# Patient Record
Sex: FEMALE | Race: WHITE | Employment: FULL TIME | URBAN - METROPOLITAN AREA
[De-identification: names, ages, dates, MRNs, and addresses within clinical notes are randomized per-mention and may not be internally consistent; named-entity substitution may affect disease eponyms.]

---

## 2018-04-13 ENCOUNTER — APPOINTMENT (OUTPATIENT)
Dept: GENERAL RADIOLOGY | Facility: CLINIC | Age: 53
End: 2018-04-13
Attending: EMERGENCY MEDICINE
Payer: COMMERCIAL

## 2018-04-13 ENCOUNTER — HOSPITAL ENCOUNTER (OUTPATIENT)
Facility: CLINIC | Age: 53
Setting detail: OBSERVATION
Discharge: HOME | End: 2018-04-14
Attending: EMERGENCY MEDICINE | Admitting: UROLOGY
Payer: COMMERCIAL

## 2018-04-13 ENCOUNTER — APPOINTMENT (OUTPATIENT)
Dept: ULTRASOUND IMAGING | Facility: CLINIC | Age: 53
End: 2018-04-13
Attending: EMERGENCY MEDICINE
Payer: COMMERCIAL

## 2018-04-13 DIAGNOSIS — N13.2 HYDRONEPHROSIS WITH URINARY OBSTRUCTION DUE TO URETERAL CALCULUS: Primary | ICD-10-CM

## 2018-04-13 DIAGNOSIS — N23 RENAL COLIC: ICD-10-CM

## 2018-04-13 DIAGNOSIS — N20.0 KIDNEY STONE: ICD-10-CM

## 2018-04-13 DIAGNOSIS — N13.30 HYDRONEPHROSIS, UNSPECIFIED HYDRONEPHROSIS TYPE: ICD-10-CM

## 2018-04-13 DIAGNOSIS — R52 INTRACTABLE PAIN: ICD-10-CM

## 2018-04-13 LAB
ALBUMIN UR-MCNC: NEGATIVE MG/DL
ANION GAP SERPL CALCULATED.3IONS-SCNC: 7 MMOL/L (ref 3–14)
APPEARANCE UR: ABNORMAL
BACTERIA #/AREA URNS HPF: ABNORMAL /HPF
BASOPHILS # BLD AUTO: 0.1 10E9/L (ref 0–0.2)
BASOPHILS NFR BLD AUTO: 0.7 %
BILIRUB UR QL STRIP: NEGATIVE
BUN SERPL-MCNC: 14 MG/DL (ref 7–30)
CALCIUM SERPL-MCNC: 8.9 MG/DL (ref 8.5–10.1)
CHLORIDE SERPL-SCNC: 104 MMOL/L (ref 94–109)
CO2 SERPL-SCNC: 28 MMOL/L (ref 20–32)
COLOR UR AUTO: YELLOW
CREAT SERPL-MCNC: 0.85 MG/DL (ref 0.52–1.04)
DIFFERENTIAL METHOD BLD: NORMAL
EOSINOPHIL # BLD AUTO: 0.1 10E9/L (ref 0–0.7)
EOSINOPHIL NFR BLD AUTO: 1.8 %
ERYTHROCYTE [DISTWIDTH] IN BLOOD BY AUTOMATED COUNT: 13.3 % (ref 10–15)
GFR SERPL CREATININE-BSD FRML MDRD: 70 ML/MIN/1.7M2
GLUCOSE SERPL-MCNC: 101 MG/DL (ref 70–99)
GLUCOSE UR STRIP-MCNC: NEGATIVE MG/DL
HCT VFR BLD AUTO: 41.6 % (ref 35–47)
HGB BLD-MCNC: 13.9 G/DL (ref 11.7–15.7)
HGB UR QL STRIP: ABNORMAL
IMM GRANULOCYTES # BLD: 0 10E9/L (ref 0–0.4)
IMM GRANULOCYTES NFR BLD: 0.1 %
KETONES UR STRIP-MCNC: NEGATIVE MG/DL
LEUKOCYTE ESTERASE UR QL STRIP: ABNORMAL
LYMPHOCYTES # BLD AUTO: 1.5 10E9/L (ref 0.8–5.3)
LYMPHOCYTES NFR BLD AUTO: 19.9 %
MCH RBC QN AUTO: 29.4 PG (ref 26.5–33)
MCHC RBC AUTO-ENTMCNC: 33.4 G/DL (ref 31.5–36.5)
MCV RBC AUTO: 88 FL (ref 78–100)
MONOCYTES # BLD AUTO: 0.7 10E9/L (ref 0–1.3)
MONOCYTES NFR BLD AUTO: 9.5 %
MUCOUS THREADS #/AREA URNS LPF: PRESENT /LPF
NEUTROPHILS # BLD AUTO: 4.9 10E9/L (ref 1.6–8.3)
NEUTROPHILS NFR BLD AUTO: 68 %
NITRATE UR QL: NEGATIVE
NRBC # BLD AUTO: 0 10*3/UL
NRBC BLD AUTO-RTO: 0 /100
PH UR STRIP: 5 PH (ref 5–7)
PLATELET # BLD AUTO: 248 10E9/L (ref 150–450)
POTASSIUM SERPL-SCNC: 3.6 MMOL/L (ref 3.4–5.3)
RBC # BLD AUTO: 4.72 10E12/L (ref 3.8–5.2)
RBC #/AREA URNS AUTO: 14 /HPF (ref 0–2)
SODIUM SERPL-SCNC: 139 MMOL/L (ref 133–144)
SOURCE: ABNORMAL
SP GR UR STRIP: 1.01 (ref 1–1.03)
SQUAMOUS #/AREA URNS AUTO: 14 /HPF (ref 0–1)
UROBILINOGEN UR STRIP-MCNC: 0 MG/DL (ref 0–2)
WBC # BLD AUTO: 7.3 10E9/L (ref 4–11)
WBC #/AREA URNS AUTO: 7 /HPF (ref 0–5)

## 2018-04-13 PROCEDURE — 80048 BASIC METABOLIC PNL TOTAL CA: CPT | Performed by: EMERGENCY MEDICINE

## 2018-04-13 PROCEDURE — 85025 COMPLETE CBC W/AUTO DIFF WBC: CPT | Performed by: EMERGENCY MEDICINE

## 2018-04-13 PROCEDURE — 96375 TX/PRO/DX INJ NEW DRUG ADDON: CPT

## 2018-04-13 PROCEDURE — 25000128 H RX IP 250 OP 636: Performed by: PHYSICIAN ASSISTANT

## 2018-04-13 PROCEDURE — 74019 RADEX ABDOMEN 2 VIEWS: CPT

## 2018-04-13 PROCEDURE — 81001 URINALYSIS AUTO W/SCOPE: CPT | Performed by: EMERGENCY MEDICINE

## 2018-04-13 PROCEDURE — 25000132 ZZH RX MED GY IP 250 OP 250 PS 637: Performed by: PHYSICIAN ASSISTANT

## 2018-04-13 PROCEDURE — 99221 1ST HOSP IP/OBS SF/LOW 40: CPT | Performed by: UROLOGY

## 2018-04-13 PROCEDURE — G0378 HOSPITAL OBSERVATION PER HR: HCPCS

## 2018-04-13 PROCEDURE — 96374 THER/PROPH/DIAG INJ IV PUSH: CPT

## 2018-04-13 PROCEDURE — 96361 HYDRATE IV INFUSION ADD-ON: CPT

## 2018-04-13 PROCEDURE — 96376 TX/PRO/DX INJ SAME DRUG ADON: CPT

## 2018-04-13 PROCEDURE — 76770 US EXAM ABDO BACK WALL COMP: CPT

## 2018-04-13 PROCEDURE — 84703 CHORIONIC GONADOTROPIN ASSAY: CPT | Performed by: EMERGENCY MEDICINE

## 2018-04-13 PROCEDURE — 25000128 H RX IP 250 OP 636: Performed by: EMERGENCY MEDICINE

## 2018-04-13 PROCEDURE — 99220 ZZC INITIAL OBSERVATION CARE,LEVL III: CPT | Performed by: PHYSICIAN ASSISTANT

## 2018-04-13 PROCEDURE — 25000125 ZZHC RX 250: Performed by: PHYSICIAN ASSISTANT

## 2018-04-13 PROCEDURE — 99285 EMERGENCY DEPT VISIT HI MDM: CPT | Mod: 25

## 2018-04-13 RX ORDER — TAMSULOSIN HYDROCHLORIDE 0.4 MG/1
0.4 CAPSULE ORAL DAILY
Status: ON HOLD | COMMUNITY
End: 2018-04-20

## 2018-04-13 RX ORDER — NALOXONE HYDROCHLORIDE 0.4 MG/ML
.1-.4 INJECTION, SOLUTION INTRAMUSCULAR; INTRAVENOUS; SUBCUTANEOUS
Status: DISCONTINUED | OUTPATIENT
Start: 2018-04-13 | End: 2018-04-14 | Stop reason: HOSPADM

## 2018-04-13 RX ORDER — ACETAMINOPHEN 650 MG/1
650 SUPPOSITORY RECTAL EVERY 4 HOURS PRN
Status: DISCONTINUED | OUTPATIENT
Start: 2018-04-13 | End: 2018-04-14 | Stop reason: HOSPADM

## 2018-04-13 RX ORDER — HYDROMORPHONE HYDROCHLORIDE 1 MG/ML
.3-.5 INJECTION, SOLUTION INTRAMUSCULAR; INTRAVENOUS; SUBCUTANEOUS
Status: DISCONTINUED | OUTPATIENT
Start: 2018-04-13 | End: 2018-04-14 | Stop reason: HOSPADM

## 2018-04-13 RX ORDER — CIPROFLOXACIN 2 MG/ML
400 INJECTION, SOLUTION INTRAVENOUS EVERY 12 HOURS
Status: DISCONTINUED | OUTPATIENT
Start: 2018-04-13 | End: 2018-04-14 | Stop reason: HOSPADM

## 2018-04-13 RX ORDER — LIDOCAINE 40 MG/G
CREAM TOPICAL
Status: DISCONTINUED | OUTPATIENT
Start: 2018-04-13 | End: 2018-04-14 | Stop reason: HOSPADM

## 2018-04-13 RX ORDER — DIPHENHYDRAMINE HYDROCHLORIDE 50 MG/ML
25 INJECTION INTRAMUSCULAR; INTRAVENOUS ONCE
Status: COMPLETED | OUTPATIENT
Start: 2018-04-13 | End: 2018-04-13

## 2018-04-13 RX ORDER — AMOXICILLIN 250 MG
1 CAPSULE ORAL 2 TIMES DAILY
Status: DISCONTINUED | OUTPATIENT
Start: 2018-04-13 | End: 2018-04-14 | Stop reason: HOSPADM

## 2018-04-13 RX ORDER — ONDANSETRON 2 MG/ML
4 INJECTION INTRAMUSCULAR; INTRAVENOUS
Status: COMPLETED | OUTPATIENT
Start: 2018-04-13 | End: 2018-04-13

## 2018-04-13 RX ORDER — ACETAMINOPHEN 325 MG/1
650 TABLET ORAL EVERY 4 HOURS PRN
Status: DISCONTINUED | OUTPATIENT
Start: 2018-04-13 | End: 2018-04-14 | Stop reason: HOSPADM

## 2018-04-13 RX ORDER — MORPHINE SULFATE 4 MG/ML
4 INJECTION, SOLUTION INTRAMUSCULAR; INTRAVENOUS
Status: DISCONTINUED | OUTPATIENT
Start: 2018-04-13 | End: 2018-04-13

## 2018-04-13 RX ORDER — OXYCODONE HYDROCHLORIDE 5 MG/1
5-10 TABLET ORAL
Status: DISCONTINUED | OUTPATIENT
Start: 2018-04-13 | End: 2018-04-14 | Stop reason: HOSPADM

## 2018-04-13 RX ORDER — ONDANSETRON 2 MG/ML
4 INJECTION INTRAMUSCULAR; INTRAVENOUS EVERY 6 HOURS PRN
Status: DISCONTINUED | OUTPATIENT
Start: 2018-04-13 | End: 2018-04-14 | Stop reason: HOSPADM

## 2018-04-13 RX ORDER — OXYCODONE AND ACETAMINOPHEN 5; 325 MG/1; MG/1
1-2 TABLET ORAL EVERY 4 HOURS PRN
COMMUNITY
End: 2018-04-17

## 2018-04-13 RX ORDER — TAMSULOSIN HYDROCHLORIDE 0.4 MG/1
0.4 CAPSULE ORAL DAILY
Status: DISCONTINUED | OUTPATIENT
Start: 2018-04-13 | End: 2018-04-14 | Stop reason: HOSPADM

## 2018-04-13 RX ORDER — HYDROMORPHONE HYDROCHLORIDE 1 MG/ML
0.5 INJECTION, SOLUTION INTRAMUSCULAR; INTRAVENOUS; SUBCUTANEOUS
Status: DISCONTINUED | OUTPATIENT
Start: 2018-04-13 | End: 2018-04-13

## 2018-04-13 RX ORDER — HYDROMORPHONE HYDROCHLORIDE 1 MG/ML
0.3 INJECTION, SOLUTION INTRAMUSCULAR; INTRAVENOUS; SUBCUTANEOUS
Status: DISCONTINUED | OUTPATIENT
Start: 2018-04-13 | End: 2018-04-13

## 2018-04-13 RX ORDER — SODIUM CHLORIDE 9 MG/ML
INJECTION, SOLUTION INTRAVENOUS CONTINUOUS
Status: DISCONTINUED | OUTPATIENT
Start: 2018-04-13 | End: 2018-04-14 | Stop reason: HOSPADM

## 2018-04-13 RX ORDER — POLYETHYLENE GLYCOL 3350 17 G/17G
17 POWDER, FOR SOLUTION ORAL DAILY PRN
Status: DISCONTINUED | OUTPATIENT
Start: 2018-04-13 | End: 2018-04-14 | Stop reason: HOSPADM

## 2018-04-13 RX ORDER — ONDANSETRON 4 MG/1
4 TABLET, ORALLY DISINTEGRATING ORAL EVERY 6 HOURS PRN
Status: DISCONTINUED | OUTPATIENT
Start: 2018-04-13 | End: 2018-04-14 | Stop reason: HOSPADM

## 2018-04-13 RX ORDER — TAMSULOSIN HYDROCHLORIDE 0.4 MG/1
0.4 CAPSULE ORAL ONCE
Status: DISCONTINUED | OUTPATIENT
Start: 2018-04-13 | End: 2018-04-13

## 2018-04-13 RX ORDER — AMOXICILLIN 250 MG
2 CAPSULE ORAL 2 TIMES DAILY
Status: DISCONTINUED | OUTPATIENT
Start: 2018-04-13 | End: 2018-04-14 | Stop reason: HOSPADM

## 2018-04-13 RX ADMIN — TAMSULOSIN HYDROCHLORIDE 0.4 MG: 0.4 CAPSULE ORAL at 14:15

## 2018-04-13 RX ADMIN — HYDROMORPHONE HYDROCHLORIDE 0.5 MG: 1 INJECTION, SOLUTION INTRAMUSCULAR; INTRAVENOUS; SUBCUTANEOUS at 18:26

## 2018-04-13 RX ADMIN — SODIUM CHLORIDE: 9 INJECTION, SOLUTION INTRAVENOUS at 20:49

## 2018-04-13 RX ADMIN — HYDROMORPHONE HYDROCHLORIDE 0.5 MG: 1 INJECTION, SOLUTION INTRAMUSCULAR; INTRAVENOUS; SUBCUTANEOUS at 13:53

## 2018-04-13 RX ADMIN — MORPHINE SULFATE 4 MG: 4 INJECTION INTRAVENOUS at 11:27

## 2018-04-13 RX ADMIN — ONDANSETRON 4 MG: 4 TABLET, ORALLY DISINTEGRATING ORAL at 14:54

## 2018-04-13 RX ADMIN — SENNOSIDES AND DOCUSATE SODIUM 2 TABLET: 8.6; 5 TABLET ORAL at 20:49

## 2018-04-13 RX ADMIN — OXYCODONE HYDROCHLORIDE 5 MG: 5 TABLET ORAL at 14:51

## 2018-04-13 RX ADMIN — DIPHENHYDRAMINE HYDROCHLORIDE 25 MG: 50 INJECTION, SOLUTION INTRAMUSCULAR; INTRAVENOUS at 10:33

## 2018-04-13 RX ADMIN — HYDROMORPHONE HYDROCHLORIDE 0.5 MG: 1 INJECTION, SOLUTION INTRAMUSCULAR; INTRAVENOUS; SUBCUTANEOUS at 16:21

## 2018-04-13 RX ADMIN — ONDANSETRON 4 MG: 2 INJECTION INTRAMUSCULAR; INTRAVENOUS at 10:33

## 2018-04-13 RX ADMIN — HYDROMORPHONE HYDROCHLORIDE 0.5 MG: 1 INJECTION, SOLUTION INTRAMUSCULAR; INTRAVENOUS; SUBCUTANEOUS at 11:51

## 2018-04-13 RX ADMIN — POLYETHYLENE GLYCOL 3350 17 G: 17 POWDER, FOR SOLUTION ORAL at 15:18

## 2018-04-13 RX ADMIN — SODIUM CHLORIDE: 9 INJECTION, SOLUTION INTRAVENOUS at 13:20

## 2018-04-13 RX ADMIN — MORPHINE SULFATE 4 MG: 4 INJECTION INTRAVENOUS at 10:33

## 2018-04-13 RX ADMIN — CIPROFLOXACIN 400 MG: 2 INJECTION, SOLUTION INTRAVENOUS at 18:28

## 2018-04-13 RX ADMIN — SODIUM CHLORIDE, POTASSIUM CHLORIDE, SODIUM LACTATE AND CALCIUM CHLORIDE 1000 ML: 600; 310; 30; 20 INJECTION, SOLUTION INTRAVENOUS at 10:33

## 2018-04-13 RX ADMIN — OXYCODONE HYDROCHLORIDE 10 MG: 5 TABLET ORAL at 20:49

## 2018-04-13 RX ADMIN — HYDROMORPHONE HYDROCHLORIDE 0.5 MG: 1 INJECTION, SOLUTION INTRAMUSCULAR; INTRAVENOUS; SUBCUTANEOUS at 12:40

## 2018-04-13 ASSESSMENT — PAIN DESCRIPTION - DESCRIPTORS
DESCRIPTORS: CONSTANT;STABBING
DESCRIPTORS: SHARP;STABBING

## 2018-04-13 ASSESSMENT — ACTIVITIES OF DAILY LIVING (ADL)
FALL_HISTORY_WITHIN_LAST_SIX_MONTHS: NO
SWALLOWING: 0-->SWALLOWS FOODS/LIQUIDS WITHOUT DIFFICULTY
COGNITION: 0 - NO COGNITION ISSUES REPORTED
AMBULATION: 0-->INDEPENDENT
RETIRED_COMMUNICATION: 0-->UNDERSTANDS/COMMUNICATES WITHOUT DIFFICULTY
DRESS: 0-->INDEPENDENT
BATHING: 0-->INDEPENDENT
WHICH_OF_THE_ABOVE_FUNCTIONAL_RISKS_HAD_A_RECENT_ONSET_OR_CHANGE?: TOILETING
RETIRED_EATING: 0-->INDEPENDENT
TOILETING: 0-->INDEPENDENT
TRANSFERRING: 0-->INDEPENDENT

## 2018-04-13 ASSESSMENT — ENCOUNTER SYMPTOMS
CHILLS: 0
VOMITING: 0
FLANK PAIN: 1
FEVER: 0
NAUSEA: 0

## 2018-04-13 NOTE — IP AVS SNAPSHOT
MRN:[id number]                      After Visit Summary   4/13/2018    Jennie Davison    MRN: [id number]           Thank you!     Thank you for choosing [facility] for your care. Our goal is always to provide you with excellent care. Hearing back from our patients is one way we can continue to improve our services. Please take a few minutes to complete the written survey that you may receive in the mail after you visit. If you would like to speak to someone directly about your visit please contact Patient Relations at [phone]. Thank you!          Patient Information     Date Of Birth          1965        Designated Caregiver       Most Recent Value    Caregiver    Will someone help with your care after discharge? yes    Name of designated caregiver .    Phone number of caregiver .    Caregiver address .      About your hospital stay     You were admitted on:  April 13, 2018 You last received care in the:  [facility] PreOP/PostOP    You were discharged on:  April 14, 2018       Who to Call     For medical emergencies, please call 911.  For non-urgent questions about your medical care, please call your primary care provider or clinic, [phone]  For questions related to your surgery, please call your surgery clinic        Attending Provider     Provider Specialty    Eve Reyes MD Emergency Medicine    [place], Ezequiel Sagastume MD Internal Medicine       Primary Care Provider Office Phone # Fax #    Kenneth G Pallas, [phone]      After Care Instructions     Discharge Instructions       DIET:  -Regular    DISCHARGE ACTIVITY  - return to normal activity only limited by hematuria (blood in urine) that gets worse with increased activity due to the stent - this is normal  - Do not drive until you can press the brake pedal quickly and fully without pain.   - Do not operate a motor vehicle while taking narcotic pain medications.     MEDICATIONS   -  Wean yourself off narcotic pain medications in the first 1-2 days. Do NOT combine with other narcotics or sources of tylenol. Do NOT drive while on narcotic pain medications  - Do not take more than 4,000mg of Tylenol (acetaminophen) in any 24 hour period, as this can cause liver damage.  - Take stool softeners such as Senna while you are using narcotics, but stop if you develop diarrhea.   - flomax and detrol may help you with your stent symptoms    STENT  INSTRUCTIONS:   - You have a ureteral stent in place. You can expect some flank discomfort on that side, possibly worse with urination, and you may experience the urge to void more frequently. You may experience burning in your urethra with urination as well. Continue to take medications as prescribed to reduce stent discomfort. Drink 2-3L of water a day to keep your urine clear to light pink in color. Some blood in your urine can be expected but should clear with increased water consumption as instructed. Call for thickening red urine, or inability to void.     FOLLOW-UP:   - Follow up with Dr. Lyons in the next few weeks for stone removal and stent removal. The Urology  will call you with your appointment date. Ureteral stents, if left in long term, can result in numerous complications, including encrustation, infection, renal failure and even possibly death. Hence it is critical that you follow up to have your stent removed.  - Call your primary care provider to touch base regarding your recent admission.     - Call or return sooner than your regularly scheduled visit if you develop any of the following:  fever, uncontrolled pain, uncontrolled nausea or vomiting, as well as worsening blood in the urine or inability to urinate (pee).                  Further instructions from your care team       STENT INFORMATION/DISCHARGE INSTRUCTIONS  [facility] / UROLOGY  DR. ANTONINO LYONS M.D.  CLINIC PHONE NUMBER:  [phone]    During surgery, a stent  may be placed in the ureter.  The ureter is the tube that drains urine from the kidney to the bladder.  The stent is placed to dilate (open) the ureter so stone fragments can pass easily through the ureter or to decrease ureteral swelling after surgery or to relieve an obstruction.      The stent is made of silicone.  The upper end of the stent curls in the kidney while the lower end rests in the bladder.    While the stent is in place you may experience the following symptoms:  Blood and/or small blood clots in the urine  Bladder spasms (frequency and urgency of urination)  Discomfort or aching in the back or side where the stent is  Burning or discomfort at the end of urine stream    To decrease these symptoms you should:  Take antispasmodic medication as prescribed (Detrol, Ditropan, etc.)  Drink plenty of fluids but avoid caffeine and citrus (include cranberry)  If you are having discomfort in back or side, decrease activity    Please call your physician or the physician on call if you experience:  Fever greater than 101 degrees  Severe pain not relieved by pain medication or rest    Please make an appointment for the removal of the stent according to your physician's instructions.      CYSTOSCOPY DISCHARGE INSTRUCTIONS    YOU MAY GO BACK TO YOUR NORMAL DIET AND ACTIVITY, UNLESS YOUR DOCTOR TELLS YOU NOT TO.    FOR THE NEXT TWO DAYS, YOU MAY NOTICE:    SOME BLOOD IN YOUR URINE.  SOME BURNING WHEN YOU URINATE (USE THE TOILET).  AN URGE TO URINATE MORE OFTEN.  BLADDER SPASMS.    THESE ARE NORMAL AFTER THE PROCEDURE.  THEY SHOULD GO AWAY AFTER A DAY OR TWO.  TO RELIEVE THESE PROBLEMS:     DRINK 6 TO 8 LARGE GLASSES OF WATER EACH DAY (INCLUDES DRINKS AT MEALS).  THIS WILL HELP CLEAR THE URINE.    TAKE WARM BATHS TO RELIEVE PAIN AND BLADDER SPASMS.  DO NOT ADD ANYTHING TO THE BATH WATER.    YOUR DOCTOR MAY PRESCRIBE PAIN MEDICINE.  YOU MAY ALSO TAKE TYLENOL (ACETAMINOPHEN) FOR PAIN.    CALL YOUR SURGEON IF YOU  HAVE:    A FEVER OVER 100 DEGREES FOR MORE THAN A DAY.  CHECK YOUR TEMPERATURE UNDER YOUR TONGUE.    CHILLS.    FAILURE TO URINATE (NO URINE COMES OUT WHEN YOU TRY TO USE THE TOILET).  TRY SOAKING IN A BATHTUB FULL OF WARM WATER.  IF STILL NO URINE, CALL YOUR DOCTOR.    A LOT OF BLOOD IN THE URINE, OR BLOOD CLOTS LARGER THAN A NICKEL.      PAIN IN THE BACK OR BELLY AREA (ABDOMEN).    PAIN OR SPASMS THAT ARE NOT RELIEVED BY WARM TUB BATHS AND PAIN MEDICINE.      SEVERE PAIN, BURNING OR OTHER PROBLEMS WHILE PASSING URINE.    PAIN THAT GETS WORSE AFTER TWO DAYS.       GENERAL ANESTHESIA OR SEDATION ADULT DISCHARGE INSTRUCTIONS   SPECIAL PRECAUTIONS FOR 24 HOURS AFTER SURGERY    IT IS NOT UNUSUAL TO FEEL LIGHT-HEADED OR FAINT, UP TO 24 HOURS AFTER SURGERY OR WHILE TAKING PAIN MEDICATION.  IF YOU HAVE THESE SYMPTOMS; SIT FOR A FEW MINUTES BEFORE STANDING AND HAVE SOMEONE ASSIST YOU WHEN YOU GET UP TO WALK OR USE THE BATHROOM.    YOU SHOULD REST AND RELAX FOR THE NEXT 24 HOURS AND YOU MUST MAKE ARRANGEMENTS TO HAVE SOMEONE STAY WITH YOU FOR AT LEAST 24 HOURS AFTER YOUR DISCHARGE.  AVOID HAZARDOUS AND STRENUOUS ACTIVITIES.  DO NOT MAKE IMPORTANT DECISIONS FOR 24 HOURS.    DO NOT DRIVE ANY VEHICLE OR OPERATE MECHANICAL EQUIPMENT FOR 24 HOURS FOLLOWING THE END OF YOUR SURGERY.  EVEN THOUGH YOU MAY FEEL NORMAL, YOUR REACTIONS MAY BE AFFECTED BY THE MEDICATION YOU HAVE RECEIVED.    DO NOT DRINK ALCOHOLIC BEVERAGES FOR 24 HOURS FOLLOWING YOUR SURGERY.    DRINK CLEAR LIQUIDS (APPLE JUICE, GINGER ALE, 7-UP, BROTH, ETC.).  PROGRESS TO YOUR REGULAR DIET AS YOU FEEL ABLE.    YOU MAY HAVE A DRY MOUTH, A SORE THROAT, MUSCLES ACHES OR TROUBLE SLEEPING.  THESE SHOULD GO AWAY AFTER 24 HOURS.    CALL YOUR DOCTOR FOR ANY OF THE FOLLOWING:  SIGNS OF INFECTION (FEVER, GROWING TENDERNESS AT THE SURGERY SITE, A LARGE AMOUNT OF DRAINAGE OR BLEEDING, SEVERE PAIN, FOUL-SMELLING DRAINAGE, REDNESS OR SWELLING.    IT HAS BEEN OVER 8 TO 10 HOURS  "SINCE SURGERY AND YOU ARE STILL NOT ABLE TO URINATE (PASS WATER).     You received Toradol, an IV form of ibuprofen (Motrin) at 9:30AM.  Do not take any ibuprofen products until 3:30PM.             Pending Results     No orders found for last 3 day(s).            Statement of Approval     Ordered          18 1103  I have reviewed and agree with all the recommendations and orders detailed in this document.  EFFECTIVE NOW     Approved and electronically signed by:  Lj Dewey MD           18 1058  I have reviewed and agree with all the recommendations and orders detailed in this document.  EFFECTIVE NOW     Approved and electronically signed by:  Kandy Srivastava PA-C             Admission Information     Date & Time Provider Department Dept. Phone    2018 Ezequiel Rodgers MD [facility] PreOP/PostOP [phone]      Your Vitals Were     Blood Pressure Pulse Temperature Respirations Height Weight    146/92 71 98.3  F (36.8  C) (Temporal) 14 1.727 m (5' 8\") 81.6 kg (180 lb)    Pulse Oximetry BMI (Body Mass Index)                98% 27.37 kg/m2          MyChart Information     [employer] lets you send messages to your doctor, view your test results, renew your prescriptions, schedule appointments and more. To sign up, go to [email or web address] . Click on \"Log in\" on the left side of the screen, which will take you to the Welcome page. Then click on \"Sign up Now\" on the right side of the page.     You will be asked to enter the access code listed below, as well as some personal information. Please follow the directions to create your username and password.     Your access code is: [email or web address]  Expires: 2018  9:23 AM     Your access code will  in 90 days. If you need help or a new code, please call your [place] clinic or [phone].        Care EveryWhere ID     This is your Care EveryWhere ID. This could be used by other organizations to access your [place] medical " records  [id number]        Equal Access to Services     NARAYAN MALDONADO : Hadii antonio Fallon, waaxda lulety, qaybta ya briceno, trina gonsalves. So [facility] [phone].    ATENCIÓN: Si habla español, tiene a oquendo disposición servicios gratuitos de asistencia lingüística. Llame al [phone].    We comply with applicable federal civil rights laws and [place] laws. We do not discriminate on the basis of race, color, national origin, age, disability, sex, sexual orientation, or gender identity.               Review of your medicines      START taking        Dose / Directions    senna 8.6 MG tablet   Commonly known as:  SENOKOT   Used for:  Renal colic        Dose:  1 tablet   Take 1 tablet by mouth 2 times daily   Quantity:  60 tablet   Refills:  0       tolterodine 4 MG 24 hr capsule   Commonly known as:  DETROL LA   Used for:  Renal colic        Dose:  4 mg   Take 1 capsule (4 mg) by mouth daily as needed For bladder spasms and stent discomfort   Quantity:  14 capsule   Refills:  0         CONTINUE these medicines which have NOT CHANGED        Dose / Directions    CIPROFLOXACIN PO        Dose:  250 mg   Take 250 mg by mouth 2 times daily Take for 5 days. Started on 4/10/18   Refills:  0       FLOMAX 0.4 MG capsule   Generic drug:  tamsulosin        Dose:  0.4 mg   Take 0.4 mg by mouth daily (received a 20 day supply)   Refills:  0       IBUPROFEN PO        Dose:  200-400 mg   Take 200-400 mg by mouth every 6 hours as needed for moderate pain   Refills:  0       LIOTHYRONINE SODIUM PO        Dose:  10 mcg   Take 10 mcg by mouth daily   Refills:  0       oxyCODONE-acetaminophen 5-325 MG per tablet   Commonly known as:  PERCOCET        Dose:  1-2 tablet   Take 1-2 tablets by mouth every 4 hours as needed   Refills:  0       SYNTHROID PO        Dose:  100 mcg   Take 100 mcg by mouth daily   Refills:  0         STOP taking     TYLENOL PO                Where to get your  medicines      These medications were sent to [facility] [place], [place] - [address] [address]  [address] [facility] [address]     Phone:  [phone]     senna 8.6 MG tablet    tolterodine 4 MG 24 hr capsule                Protect others around you: Learn how to safely use, store and throw away your medicines at www.disposemymeds.org.        ANTIBIOTIC INSTRUCTION     You've Been Prescribed an Antibiotic - Now What?  Your healthcare team thinks that you or your loved one might have an infection. Some infections can be treated with antibiotics, which are powerful, life-saving drugs. Like all medications, antibiotics have side effects and should only be used when necessary. There are some important things you should know about your antibiotic treatment.      Your healthcare team may run tests before you start taking an antibiotic.    Your team may take samples (e.g., from your blood, urine or other areas) to run tests to look for bacteria. These test can be important to determine if you need an antibiotic at all and, if you do, which antibiotic will work best.      Within a few days, your healthcare team might change or even stop your antibiotic.    Your team may start you on an antibiotic while they are working to find out what is making you sick.    Your team might change your antibiotic because test results show that a different antibiotic would be better to treat your infection.    In some cases, once your team has more information, they learn that you do not need an antibiotic at all. They may find out that you don't have an infection, or that the antibiotic you're taking won't work against your infection. For example, an infection caused by a virus can't be treated with antibiotics. Staying on an antibiotic when you don't need it is more likely to be harmful than helpful.      You may experience side effects from your antibiotic.    Like all medications, antibiotics have side  effects. Some of these can be serious.    Let you healthcare team know if you have any known allergies when you are admitted to the hospital.    One significant side effect of nearly all antibiotics is the risk of severe and sometimes deadly diarrhea caused by Clostridium difficile (C. Difficile). This occurs when a person takes antibiotics because some good germs are destroyed. Antibiotic use allows C. diificile to take over, putting patients at high risk for this serious infection.    As a patient or caregiver, it is important to understand your or your loved one's antibiotic treatment. It is especially important for caregivers to speak up when patients can't speak for themselves. Here are some important questions to ask your healthcare team.    What infection is this antibiotic treating and how do you know I have that infection?    What side effects might occur from this antibiotic?    How long will I need to take this antibiotic?    Is it safe to take this antibiotic with other medications or supplements (e.g., vitamins) that I am taking?     Are there any special directions I need to know about taking this antibiotic? For example, should I take it with food?    How will I be monitored to know whether my infection is responding to the antibiotic?    What tests may help to make sure the right antibiotic is prescribed for me?      Information provided by:  www.cdc.gov/getsmart  U.S. Department of Health and Human Services  Centers for disease Control and Prevention  National Center for Emerging and Zoonotic Infectious Diseases  Division of Healthcare Quality Promotion        Information about OPIOIDS     PRESCRIPTION OPIOIDS: WHAT YOU NEED TO KNOW    Prescription opioids can be used to help relieve moderate to severe pain and are often prescribed following a surgery or injury, or for certain health conditions. These medications can be an important part of treatment but also come with serious risks. It is important  to work with your health care provider to make sure you are getting the safest, most effective care.    WHAT ARE THE RISKS AND SIDE EFFECTS OF OPIOID USE?  Prescription opioids carry serious risks of addiction and overdose, especially with prolonged use. An opioid overdose, often marked by slowed breathing can cause sudden death. The use of prescription opioids can have a number of side effects as well, even when taken as directed:      Tolerance - meaning you might need to take more of a medication for the same pain relief    Physical dependence - meaning you have symptoms of withdrawal when a medication is stopped    Increased sensitivity to pain    Constipation    Nausea, vomiting, and dry mouth    Sleepiness and dizziness    Confusion    Depression    Low levels of testosterone that can result in lower sex drive, energy, and strength    Itching and sweating    RISKS ARE GREATER WITH:    History of drug misuse, substance use disorder, or overdose    Mental health conditions (such as depression or anxiety)    Sleep apnea    Older age (65 years or older)    Pregnancy    Avoid alcohol while taking prescription opioids.   Also, unless specifically advised by your health care provider, medications to avoid include:    Benzodiazepines (such as Xanax or Valium)    Muscle relaxants (such as Soma or Flexeril)    Hypnotics (such as Ambien or Lunesta)    Other prescription opioids    KNOW YOUR OPTIONS:  Talk to your health care provider about ways to manage your pain that do not involve prescription opioids. Some of these options may actually work better and have fewer risks and side effects:    Pain relievers such as acetaminophen, ibuprofen, and naproxen    Some medications that are also used for depression or seizures    Physical therapy and exercise    Cognitive behavioral therapy, a psychological, goal-directed approach, in which patients learn how to modify physical, behavioral, and emotional triggers of pain and  stress    IF YOU ARE PRESCRIBED OPIOIDS FOR PAIN:    Never take opioids in greater amounts or more often than prescribed    Follow up with your primary health care provider and work together to create a plan on how to manage your pain.    Talk about ways to help manage your pain that do not involve prescription opioids    Talk about all concerns and side effects    Help prevent misuse and abuse    Never sell or share prescription opioids    Never use another person's prescription opioids    Store prescription opioids in a secure place and out of reach of others (this may include visitors, children, friends, and family)    Visit www.cdc.gov/drugoverdose to learn about risks of opioid abuse and overdose    If you believe you may be struggling with addiction, tell your health care provider and ask for guidance or call [facility]'s National Helpline at 8-239-832-HELP    LEARN MORE / www.cdc.gov/drugoverdose/prescribing/guideline.html    Safely dispose of unused prescription opioids: Find your local drug take-back programs and more information about the importance of safe disposal at www.doseofreality.[place].gov             Medication List: This is a list of all your medications and when to take them. Check marks below indicate your daily home schedule. Keep this list as a reference.      Medications           Morning Afternoon Evening Bedtime As Needed    CIPROFLOXACIN PO   Take 250 mg by mouth 2 times daily Take for 5 days. Started on 4/10/18                                FLOMAX 0.4 MG capsule   Take 0.4 mg by mouth daily (received a 20 day supply)   Last time this was given:  0.4 mg on 4/13/2018  2:15 PM   Generic drug:  tamsulosin                                IBUPROFEN PO   Take 200-400 mg by mouth every 6 hours as needed for moderate pain                                LIOTHYRONINE SODIUM PO   Take 10 mcg by mouth daily                                oxyCODONE-acetaminophen 5-325 MG per tablet   Commonly known as:   PERCOCET   Take 1-2 tablets by mouth every 4 hours as needed                                senna 8.6 MG tablet   Commonly known as:  SENOKOT   Take 1 tablet by mouth 2 times daily                                SYNTHROID PO   Take 100 mcg by mouth daily                                tolterodine 4 MG 24 hr capsule   Commonly known as:  DETROL LA   Take 1 capsule (4 mg) by mouth daily as needed For bladder spasms and stent discomfort

## 2018-04-13 NOTE — ED PROVIDER NOTES
"  History     Chief Complaint:  Flank Pain      HPI   Jennie Davison is a 52 year old female who presents with left flank pain. The patient was seen on Monday, 4 days ago, at [facility] and diagnosed with a 4mm kidney stone. Patient stayed overnight and was going to have a procedure on Tuesday. Patient woke up Tuesday with no pain. Patient was sent home without procedure. Patient has had intermittent pain in her left flank since being discharged but it has become more severe and this morning was intolerable.  She took 1 tablet Percocet around 0915 and another around 0930. Patient is on Flomax. The patient hasn't had any nausea/vomiting, fevers, or chills. She last had a kidney stone pass around 12 years ago. She does not currently have a urologist.      [facility]: CT Abdomen/Pelvis w/o IV contrast-stone protocol (04/09/18):   (Impression)  1. Mild-moderate left-sided hydroureteronephrosis upstream from an obstructive 4 x 4 mm calculus within the distal left ureter.    2. Nonobstructive subcentimeter bilateral renal calculi.    3. Multiple simple-appearing cysts bilaterally, as well as a Bosniak 2 cyst within the superior pole of the left kidney.    4. Cholelithiasis, without CT evidence of acute cholecystitis.    Allergies:  NKDA     Medications:    Synthroid  Ciprofloxacin  Flomax  Percocet    Past Medical History:    Kidney stones    Past Surgical History:    The patient does not have any pertinent past surgical history  Family History:    No past pertinent family history.     Social History:  The patient denies tobacco or alcohol use.  Marital Status:   [2]     Review of Systems   Constitutional: Negative for chills and fever.   Gastrointestinal: Negative for nausea and vomiting.   Genitourinary: Positive for flank pain.   All other systems reviewed and are negative.        Physical Exam   First Vitals:  BP: (!) 160/103  Pulse: 104  Temp: 98.5  F (36.9  C)  Resp: 18  Height: 172.7 cm (5' 8\")  Weight: " 81.6 kg (180 lb)  SpO2: 97 %      Physical Exam  General: uncomfortable appearing adult female sitting upright  Eyes: PERRL, Conjunctive within normal limits  ENT: Moist mucous membranes, oropharynx clear.   CV: Normal S1S2, no murmur, rub or gallop. Regular rate and rhythm. Tender to percussion on the left abdomen. diffuse left side tenderness on palpation.  Resp: Clear to auscultation bilaterally, no wheezes, rales or rhonchi. Normal respiratory effort.  GI: Abdomen is soft, nontender and nondistended. No palpable masses. No rebound or guarding.  MSK: No edema. Nontender. Normal active range of motion.  Skin: Warm and dry. No rashes or lesions or ecchymoses on visible skin.  Neuro: Alert and oriented. Responds appropriately to all questions and commands. No focal findings appreciated. Normal muscle tone.  Psych: Appropriate mood and affect. Pleasant.      Emergency Department Course     Imaging:  Radiographic findings were communicated with the patient who voiced understanding of the findings.  XR KUB:   No radiopaque urinary calcifications appreciated. Marked  stool and gas-filled colon and rectum. As per radiology.       US Retroperitoneal:  Moderate left hydronephrosis. As per radiology.       Laboratory:  CBC: WBC: 7.3, HGB: 13.96, PLT: 248    BMP: Glucose 101, o/w WNL (Creatinine: 0.85)    UA with micro: Bloo - Small, Leukocyte Esterase - Trace, Bacteria - Moderate, Squamous Epithelial/HPF 14, Mucous - Present, RBC/HPF 14, WBC/HPF 7, o/w negative    Interventions:  1033 Lactated ringers 1000 mL IV Bolus   1127 Morphine 4 mg IV  1033 Benadryl 25 mg IV  1033 Zofran 4 mg IV  1151 Dilaudid 0.5 mg IV      Emergency Department Course:  Nursing notes and vitals reviewed.     1014 I performed an exam of the patient as documented above.     IV inserted. Medicine administered as documented above.     Blood drawn. This was sent to the lab for further testing, results above.    The patient provided a urine sample here in  the emergency department. This was sent for laboratory testing, findings above.     The patient was sent for a KUB XR while in the emergency department, findings above.     The patient was sent for a retroperitoneal US while in the emergency department, findings above.     I rechecked the patient and discussed the results of her workup thus far. She is still having pain, requesting more pain medication.  Agreeable with the plan for admission.    1208 I consulted with Dr. Lyons, Urology, regarding the patient's history and presentation here in the emergency department.    1215 I consulted with Jesenia Pryor PA-C, accepting on behalf of Dr. Rodgers, Hospitalist, regarding the patient's history and presentation here in the emergency department.    Findings and plan explained to the patient who consents to admission. Discussed the patient with Dr. Rodgers, who will admit the patient to an observation bed for further monitoring, evaluation, and treatment.        Impression & Plan      Medical Decision Making:  Jennie Davison is a 52 year old female with a recent diagnosis of a left ureteral stone, who was admitted at [facility], who presents to the emergency department for concerns of uncontrolled pain. She ultimately did not have the procedure there, with observation at home instead. Here in the ED, despite Percocet at home, she had uncontrolled pain. She had multiple doses of IV narcotics with ongoing pain suggesting need for admission for ongoing pain control. She had normal renal function and no signs of infection. Otherwise, there is left hydronephrosis, but no visible stone on KUB or ultrasound. I discussed her care with Dr. Lyons of Urology, who is aware of her admission with possibility of intervention needed. I discussed admission with the patient. She verbalized understanding and agreement.      Diagnosis:    ICD-10-CM   1. Renal colic N23   2. Intractable pain R52   3. Hydronephrosis, unspecified  hydronephrosis type N13.30       Disposition:  Admitted to Jennifer Pryor PA-C of the Hospitalist service, in stable condition to an observation bed.    Discharge Medications:    I, Daniel Sales, am serving as a scribe on 4/13/2018 at 10:14 AM to personally document services performed by Eve Reyes MD based on my observations and the provider's statements to me.       Daniel Sales  4/13/2018   [facility] EMERGENCY DEPARTMENT       Eve Reyes MD  04/13/18 1418

## 2018-04-13 NOTE — PLAN OF CARE
Problem: Patient Care Overview  Goal: Discharge Needs Assessment  Outcome: No Change  ROOM # 212-1    Living Situation (if not independent, order SW consult): With  Juan Miguel in [place]   Facility name:  : Juan Miguel (spouse) [phone]    Activity level at baseline: Independent   Activity level on admit: Independent      Patient registered to observation; given Patient Bill of Rights; given the opportunity to ask questions about observation status and their plan of care.  Patient has been oriented to the observation room, bathroom and call light is in place.    Discussed discharge goals and expectations with patient/family.

## 2018-04-13 NOTE — ED NOTES
ABCs intact. Pt c/o L flank pain. Pt was seen on Monday at [facility] and dx with a 4mm stone. Pt stayed overnight and was going to have a procedure on Tuesday. Pt woke up Tuesday with no pain. Pt was sent home without procedure. Pt c/o intermittent pain since discharge. Pt took Percocet 1 tablet around 0915 and 0930. Pt is on flomax and abx as well.

## 2018-04-13 NOTE — PLAN OF CARE
Problem: Patient Care Overview  Goal: Discharge Needs Assessment  Outcome: No Change  PRIMARY DIAGNOSIS: ACUTE RENAL COLIC, Left    OUTPATIENT/OBSERVATION GOALS TO BE MET BEFORE DISCHARGE  1. Pain Status: Improved but still requiring IV narcotics.    2. Tolerating adequate PO diet: No, NPO for urology consult    3. Surgical Intervention planned: Yes, tomorrow AM if stone isn't passed    4. Cleared by consultants (if involved): No    5. Return to near baseline physical activity: Yes    Discharge Planner Nurse   Safe discharge environment identified: Yes  Barriers to discharge: No       Entered by: Mamie Johns 04/13/2018 2:04 PM     Please review provider order for any additional goals.   Nurse to notify provider when observation goals have been met and patient is ready for discharge.    Alert and oriented x4. VSS. Severe 8/10 left abdomen pain described as sharp, constant. Gave IV dilaudid.  with urology saw patient and will plan to do stone removal tomorrow, unless stone is passed. Straining urine. Has fluids at 150ml/hr. On IV flomax. Has not had a BM since Monday 4/9, will give stool regimen. Patient given the ok to eat and will start oral regimen after an hour. Patient up independently but educated to call if feeling unsteady/unsafe. Will continue to monitor.

## 2018-04-13 NOTE — PLAN OF CARE
Problem: Patient Care Overview  Goal: Individualization & Mutuality  Outcome: No Change  PRIMARY DIAGNOSIS: PAIN; Moderate Left Hydronephrosis 2* (L) Ureteral Stone  OUTPATIENT/OBSERVATION GOALS TO BE MET BEFORE DISCHARGE:  1. Pain Status: Improved but still requiring IV narcotics.     2. Return to near baseline physical activity: No, constipation & intermittent nausea persisting    3. Cleared for discharge by consultants (if involved): No; has not passed any stones     Discharge Planner Nurse   Safe discharge environment identified: No; constipation with intermittent nausea & persist intermittent pain  Barriers to discharge: Yes; has not passed Any stones in urine        Entered by: LEORA RODRIGUEZ 04/13/2018 5:20 PM     Please review provider order for any additional goals.   Nurse to notify provider when observation goals have been met and patient is ready for discharge.

## 2018-04-13 NOTE — PHARMACY-ADMISSION MEDICATION HISTORY
Admission medication history interview status for this patient is complete. See [facility] admission navigator for allergy information, prior to admission medications and immunization status.     Medication history interview source(s):Patient  Medication history resources (including written lists, pill bottles, clinic record):Printed list  Primary pharmacy:[facility]    Changes made to PTA medication list:  Added: all  Deleted: none  Changed: none    Actions taken by pharmacist (provider contacted, etc):None     Additional medication history information:None    Medication reconciliation/reorder completed by provider prior to medication history? No    Do you take OTC medications (eg tylenol, ibuprofen, fish oil, eye/ear drops, etc)? Y(Y/N)    For patients on insulin therapy: N (Y/N)    Time spent in this activity: 5 min    Prior to Admission medications    Medication Sig Last Dose Taking? Auth Provider   IBUPROFEN PO Take 200-400 mg by mouth every 6 hours as needed for moderate pain 4/13/2018 at Unknown time Yes Unknown, Entered By History   Acetaminophen (TYLENOL PO) Take 325-650 mg by mouth every 6 hours as needed for mild pain or fever 4/13/2018 at Unknown time Yes Unknown, Entered By History   Levothyroxine Sodium (SYNTHROID PO) Take 100 mcg by mouth daily 4/13/2018 at Unknown time Yes Unknown, Entered By History   LIOTHYRONINE SODIUM PO Take 10 mcg by mouth daily 4/13/2018 at Unknown time Yes Unknown, Entered By History   CIPROFLOXACIN PO Take 250 mg by mouth 2 times daily Take for 5 days. Started on 4/10/18 4/13/2018 at Unknown time Yes Unknown, Entered By History   oxyCODONE-acetaminophen (PERCOCET) 5-325 MG per tablet Take 1-2 tablets by mouth every 4 hours as needed 4/13/2018 at Unknown time Yes Unknown, Entered By History   tamsulosin (FLOMAX) 0.4 MG capsule Take 0.4 mg by mouth daily 4/12/2018 at noon Yes Unknown, Entered By History

## 2018-04-13 NOTE — CONSULTS
Consult Date:  04/13/2018      UROLOGY CONSULTATION      REASON FOR CONSULTATION:  Left ureteral stone.      HISTORY OF PRESENT ILLNESS:  Jennie Davison is a 52-year-old woman who began having severe left-sided flank pain while she was at work on Monday.  She went to the Emergency Department at [facility] in [place] and was diagnosed with a 4 mm distal left ureteral stone.  Her urinalysis and labs otherwise appeared unremarkable at that time.  She was admitted to the hospital overnight at [facility] on Monday night, and she says that they had initially planned some form of stone procedure possibly on Tuesday, but then her pain resolved completely.  She was ultimately discharged home to try to pass her stone.  She has done poorly at home and has had more or less continued left-sided flank pain since being discharged from [facility].  She came back to the Emergency Department here this afternoon with the above symptoms.  A KUB performed here did not show the stone, but a renal ultrasound showed persistent left hydronephrosis.  Her urinalysis continues to show hematuria to be present.  She has had no fevers, chills, nausea or vomiting.      PAST MEDICAL HISTORY:     1.  She has had 1 prior stone about a decade ago that she was able to successfully pass on her own.   2.  Hypothyroid.      PAST SURGICAL HISTORY:  No prior surgeries.      MEDICATIONS:  Synthroid.  Currently on Flomax and Percocet.      ALLERGIES:  NO KNOWN DRUG ALLERGIES.      FAMILY HISTORY:  No family history of urologic malignancy or stones.      SOCIAL HISTORY:  She is a nonsmoker.      REVIEW OF SYSTEMS:  Negative for fevers, chills, nausea or vomiting.  She has solely had the left-sided flank pain.      PHYSICAL EXAMINATION:   VITAL SIGNS:  She is afebrile, vital signs are stable.   GENERAL:  Currently, alert and oriented, in no acute distress.   HEENT:  Normocephalic, atraumatic.   RESPIRATORY:  Normal nonlabored  breathing.   ABDOMEN:  Soft, nontender, nondistended.      IMAGING STUDIES:  I do not have the images from [facility], but I do have the report available that said she had a 4 mm distal left ureteral stone with hydronephrosis at that time.  Renal ultrasound performed in our hospital shows left hydronephrosis.  The stone is not visible on the KUB.      IMPRESSION AND PLAN:  A 52-year-old woman with a 4 mm distal left ureteral stone.  She has been trying to pass the stone since at least Monday with no success.  We discussed her options, which are to continue with trial of passage versus treating the stone.  She would like to have the stone treated as soon as possible.  I did contact the operating room this afternoon, and they said they have no availability this afternoon or evening.  Therefore, we can tentatively plan on a stone procedure for tomorrow.  I discussed ureteral stenting versus stone extraction plus stenting in detail today.  She will be on high-rate IV fluids and Flomax overnight, and we will strain the urine overnight.  If she does not pass the stone overnight, she will likely be scheduled for stone procedure tomorrow.  N.p.o. after midnight.         ANTONINO WATTS MD             D: 2018   T: 2018   MT: ROGELIO      Name:     WIL ALMENDAREZ   MRN:      [id number]        Account:       [id number]   :      1965           Consult Date:  2018      Document: [id number]

## 2018-04-13 NOTE — H&P
History and Physical     Jennie Davison MRN# [id number]   YOB: 1965 Age: 52 year old      Date of Admission:  4/13/2018    Primary care provider: Pallas, Kenneth G          Assessment and Plan:   Jennie Davison is a 52 year old female with a PMH significant for migraines, kidney stone and thyroid cancer s/p thyroidectomy  who presents with continued LUQ/flank pain.    Patient was discussed with Dr. Reyes, who was provider in ED. Chart review of ED work up was reviewed as well as chart review of Care Everywhere, previous visits and admissions.     1.  Left-sided nephrolithiasis with hydronephrosis  Patient was diagnosed with a 4 mm stone on Monday at [facility].  She was admitted overnight for high rate fluids, Flomax, pain control and antibiotics.  The following day her pain was gone so she was discharged home but unfortunately her pain returned in the exact same area.  The pain has become more constant and severe in nature.  Repeat imaging today does not show the stone on KUB x-ray.  But does show a fair amount of hydronephrosis in the left kidney suggestive of blockage.  Her white blood cell count is normal, she does not have fever and her UA does not appear infected.  Dr. Lyons was called from the ED and plans to see her.   -Plan for IV pain and nausea control  -High rate fluids at 150 mL/h  -Flomax  -Strain urine  -Continue ciprofloxacin 500 mg twice daily, this is planned to continue through Saturday  -Urology consultation    2.  History of thyroid cancer status post thyroidectomy  Continue liothyronine and levothyroxine      Social: No concerns  Code: Discussed with patient and they have chosen Full code  VTE prophylaxis: Ambulation  Disposition: Observation                    Chief Complaint:   LUQ/flank pain         History of Present Illness:   Jennie Davison is a 52 year old female who presents with continued left upper quadrant/ flank pain.  She states that she noticed pain on  Monday that was intermittent in nature and very severe.  She presented to [facility] and was found to have a 4 mm stone.  She was admitted with high rate fluids, Flomax and IV antibiotics.  The following morning she did not have pain so she was discharged home on Flomax, oxycodone, and ciprofloxacin.  She reports that since discharge she has continued to have left upper quadrant and left flank pain that was intermittent but became more continuous today.  She denies hematuria, fever and chills.  Previously she has related her stones to the use of Topamax.  She was recently restarted on this by her primary care doctor for her migraines.  She is planning to meet with [facility] regarding her headaches in the near future.  She denies nausea, vomiting, chest pain, shortness of breath, cough, and diarrhea.             Past Medical History:   Migraine  Thyroid cancer          Past Surgical History:   Thyroidectomy            Social History:     Social History     Social History     Marital status:      Spouse name: N/A     Number of children: N/A     Years of education: N/A     Occupational History     Not on file.     Social History Main Topics     Smoking status: Not on file     Smokeless tobacco: Not on file     Alcohol use Not on file     Drug use: Not on file     Sexual activity: Not on file     Other Topics Concern     Not on file     Social History Narrative               Family History:   Family history reviewed and is non contributory         Allergies:    No Known Allergies            Medications:     Prior to Admission medications    Medication Sig Last Dose Taking? Auth Provider   IBUPROFEN PO Take 200-400 mg by mouth every 6 hours as needed for moderate pain 4/13/2018 at Unknown time Yes Unknown, Entered By History   Acetaminophen (TYLENOL PO) Take 325-650 mg by mouth every 6 hours as needed for mild pain or fever 4/13/2018 at Unknown time Yes Unknown, Entered By History   Levothyroxine Sodium  "(SYNTHROID PO) Take 100 mcg by mouth daily 4/13/2018 at Unknown time Yes Unknown, Entered By History   LIOTHYRONINE SODIUM PO Take 10 mcg by mouth daily 4/13/2018 at Unknown time Yes Unknown, Entered By History   CIPROFLOXACIN PO Take 250 mg by mouth 2 times daily Take for 5 days. Started on 4/10/18 4/13/2018 at Unknown time Yes Unknown, Entered By History   oxyCODONE-acetaminophen (PERCOCET) 5-325 MG per tablet Take 1-2 tablets by mouth every 4 hours as needed 4/13/2018 at Unknown time Yes Unknown, Entered By History   tamsulosin (FLOMAX) 0.4 MG capsule Take 0.4 mg by mouth daily (received a 20 day supply) 4/12/2018 at noon Yes Unknown, Entered By History              Review of Systems:   A Comprehensive greater than 10 system review of systems was carried out.  Pertinent positives and negatives are noted above.  Otherwise negative for contributory information.            Physical Exam:   Blood pressure 129/78, pulse 85, temperature 96.8  F (36  C), temperature source Oral, resp. rate 20, height 1.727 m (5' 8\"), weight 81.6 kg (180 lb), SpO2 95 %.  Exam:  GENERAL:  Comfortable.  PSYCH: pleasant, oriented, No acute distress.  HEENT:  PERRLA. Normal conjunctiva, normal hearing, nasal mucosa and Oropharynx are normal.  NECK:  Supple, no neck vein distention, adenopathy or bruits, normal thyroid.  HEART:  Normal S1, S2 with no murmur, no pericardial rub, gallops or S3 or S4.  LUNGS:  Clear to auscultation, normal Respiratory effort. No wheezing, rales or ronchi.  ABDOMEN:  Soft, no hepatosplenomegaly, normal bowel sounds. Tender LUQ and CVA tenderness noted.  EXTREMITIES:  No pedal edema, +2 pulses bilateral and equal.  SKIN:  Dry to touch, No rash, wound or ulcerations.  NEUROLOGIC:  CN 2-12 intact,  sensation is intact with no focal deficits.               Data:       Recent Labs  Lab 04/13/18  1025   WBC 7.3   HGB 13.9   HCT 41.6   MCV 88          Recent Labs  Lab 04/13/18  1025      POTASSIUM 3.6 "   CHLORIDE 104   CO2 28   ANIONGAP 7   *   BUN 14   CR 0.85   GFRESTIMATED 70   GFRESTBLACK 85   ROGER 8.9         Recent Results (from the past 24 hour(s))   KUB XR    Narrative    XR KUB 4/13/2018 11:09 AM    HISTORY: Pain.    COMPARISON: None.      Impression    IMPRESSION: No radiopaque urinary calcifications appreciated. Marked  stool and gas-filled colon and rectum.    YANIV INGRAM MD   Retroperitoneal US    Narrative    US RENAL COMPLETE 4/13/2018 11:11 AM     HISTORY: Left flank pain. History of distal ureteral stone 4/9/18.      FINDINGS: I suspect incidental fatty liver infiltration. There appear  to be multiple right renal stones, with the largest measuring 0.8 cm.  There is a 1.7 cm right renal cyst. There are multiple left renal  cysts, with the largest measuring 1.7 cm. There is a 2.5 cm left renal  cyst. There is moderate left hydronephrosis . A fluid-filled bladder  is not seen      Impression    IMPRESSION:  Moderate left hydronephrosis.    MD Jennifer PICKERING PA-C

## 2018-04-13 NOTE — ED NOTES
"[facility]  ED Nurse Handoff Report    Jennie Davison is a 52 year old female   ED Chief complaint: Flank Pain  . ED Diagnosis:   Final diagnoses:   Renal colic   Intractable pain   Hydronephrosis, unspecified hydronephrosis type     Allergies: No Known Allergies    Code Status: Full Code  Activity level - Baseline/Home:  Independent. Activity Level - Current:   Independent. Lift room needed: No. Bariatric: No   Needed: No   Isolation: No. Infection: Not Applicable.     Vital Signs:   Vitals:    04/13/18 1001 04/13/18 1115 04/13/18 1145   BP: (!) 160/103 141/74 139/77   Pulse: 104     Resp: 18     Temp: 98.5  F (36.9  C)     TempSrc: Temporal     SpO2: 97% 97% 96%   Weight: 81.6 kg (180 lb)     Height: 1.727 m (5' 8\")         Cardiac Rhythm:  ,      Pain level: 0-10 Pain Scale: 8  Patient confused: No. Patient Falls Risk: No.   Elimination Status: Has voided   Patient Report - Initial Complaint: left flank pain and nausea. Focused Assessment: left flank pain and nausea   Tests Performed: US, xray, labs. Abnormal Results:   Labs Ordered and Resulted from Time of ED Arrival Up to the Time of Departure from the ED   ROUTINE UA WITH MICROSCOPIC - Abnormal; Notable for the following:        Result Value    Blood Urine Small (*)     Leukocyte Esterase Urine Trace (*)     WBC Urine 7 (*)     RBC Urine 14 (*)     Bacteria Urine Moderate (*)     Squamous Epithelial /HPF Urine 14 (*)     Mucous Urine Present (*)     All other components within normal limits   BASIC METABOLIC PANEL - Abnormal; Notable for the following:     Glucose 101 (*)     All other components within normal limits   CBC WITH PLATELETS DIFFERENTIAL   PULSE OXIMETRY NURSING   PERIPHERAL IV CATHETER     US RENAL COMPLETE 4/13/2018 11:11 AM      HISTORY: Left flank pain. History of distal ureteral stone 4/9/18.       FINDINGS: I suspect incidental fatty liver infiltration. There appear  to be multiple right renal stones, with the " largest measuring 0.8 cm.  There is a 1.7 cm right renal cyst. There are multiple left renal  cysts, with the largest measuring 1.7 cm. There is a 2.5 cm left renal  cyst. There is moderate left hydronephrosis . A fluid-filled bladder  is not seen         IMPRESSION:  Moderate left hydronephrosis.         XR KUB 4/13/2018 11:09 AM     HISTORY: Pain.     COMPARISON: None.         IMPRESSION: No radiopaque urinary calcifications appreciated. Marked  stool and gas-filled colon and rectum.     YANIV INGRAM MD  .   Treatments provided: pain medication, fluids  Family Comments: supportive at bedside.  OBS brochure/video discussed/provided to patient:  Yes  ED Medications:   Medications   morphine (PF) injection 4 mg (4 mg Intravenous Given 4/13/18 1127)   HYDROmorphone (PF) (DILAUDID) injection 0.5 mg (0.5 mg Intravenous Given 4/13/18 1151)   tamsulosin (FLOMAX) capsule 0.4 mg (not administered)   lactated ringers BOLUS 1,000 mL (1,000 mLs Intravenous New Bag 4/13/18 1033)   diphenhydrAMINE (BENADRYL) injection 25 mg (25 mg Intravenous Given 4/13/18 1033)   ondansetron (ZOFRAN) injection 4 mg (4 mg Intravenous Given 4/13/18 1033)     Drips infusing:  No  For the majority of the shift, the patient's behavior Green. Interventions performed were none.     Severe Sepsis OR Septic Shock Diagnosis Present: No      ED Nurse Name/Phone Number: Janeen DICKERSON Nichol,   12:27 PM    RECEIVING UNIT ED HANDOFF REVIEW    Above ED Nurse Handoff Report was reviewed: Yes  Reviewed by: Mamie Johns on April 13, 2018 at 12:42 PM

## 2018-04-13 NOTE — IP AVS SNAPSHOT
[facility] PreOP/PostOP    [address]    [facility] [address]    Phone:  [phone]    Fax:  [phone]                                       After Visit Summary   4/13/2018    Jennie Davison    MRN: [id number]           After Visit Summary Signature Page     I have received my discharge instructions, and my questions have been answered. I have discussed any challenges I see with this plan with the nurse or doctor.    ..........................................................................................................................................  Patient/Patient Representative Signature      ..........................................................................................................................................  Patient Representative Print Name and Relationship to Patient    ..................................................               ................................................  Date                                            Time    ..........................................................................................................................................  Reviewed by Signature/Title    ...................................................              ..............................................  Date                                                            Time

## 2018-04-14 ENCOUNTER — APPOINTMENT (OUTPATIENT)
Dept: GENERAL RADIOLOGY | Facility: CLINIC | Age: 53
End: 2018-04-14
Attending: HOSPITALIST
Payer: COMMERCIAL

## 2018-04-14 ENCOUNTER — ANESTHESIA EVENT (OUTPATIENT)
Dept: SURGERY | Facility: CLINIC | Age: 53
End: 2018-04-14
Payer: COMMERCIAL

## 2018-04-14 ENCOUNTER — ANESTHESIA (OUTPATIENT)
Dept: SURGERY | Facility: CLINIC | Age: 53
End: 2018-04-14
Payer: COMMERCIAL

## 2018-04-14 VITALS
SYSTOLIC BLOOD PRESSURE: 145 MMHG | RESPIRATION RATE: 14 BRPM | DIASTOLIC BLOOD PRESSURE: 85 MMHG | HEART RATE: 71 BPM | BODY MASS INDEX: 27.28 KG/M2 | WEIGHT: 180 LBS | TEMPERATURE: 98.5 F | OXYGEN SATURATION: 97 % | HEIGHT: 68 IN

## 2018-04-14 LAB
ANION GAP SERPL CALCULATED.3IONS-SCNC: 5 MMOL/L (ref 3–14)
BUN SERPL-MCNC: 12 MG/DL (ref 7–30)
CALCIUM SERPL-MCNC: 8.1 MG/DL (ref 8.5–10.1)
CHLORIDE SERPL-SCNC: 105 MMOL/L (ref 94–109)
CO2 SERPL-SCNC: 28 MMOL/L (ref 20–32)
CREAT SERPL-MCNC: 0.99 MG/DL (ref 0.52–1.04)
ERYTHROCYTE [DISTWIDTH] IN BLOOD BY AUTOMATED COUNT: 13.6 % (ref 10–15)
GFR SERPL CREATININE-BSD FRML MDRD: 59 ML/MIN/1.7M2
GLUCOSE SERPL-MCNC: 134 MG/DL (ref 70–99)
HCG SERPL QL: NEGATIVE
HCT VFR BLD AUTO: 36.9 % (ref 35–47)
HGB BLD-MCNC: 12.1 G/DL (ref 11.7–15.7)
MCH RBC QN AUTO: 29.4 PG (ref 26.5–33)
MCHC RBC AUTO-ENTMCNC: 32.8 G/DL (ref 31.5–36.5)
MCV RBC AUTO: 90 FL (ref 78–100)
PLATELET # BLD AUTO: 220 10E9/L (ref 150–450)
POTASSIUM SERPL-SCNC: 3.8 MMOL/L (ref 3.4–5.3)
RBC # BLD AUTO: 4.12 10E12/L (ref 3.8–5.2)
SODIUM SERPL-SCNC: 138 MMOL/L (ref 133–144)
WBC # BLD AUTO: 8.5 10E9/L (ref 4–11)

## 2018-04-14 PROCEDURE — 25000128 H RX IP 250 OP 636: Performed by: UROLOGY

## 2018-04-14 PROCEDURE — 25000125 ZZHC RX 250: Performed by: NURSE ANESTHETIST, CERTIFIED REGISTERED

## 2018-04-14 PROCEDURE — 40000306 ZZH STATISTIC PRE PROC ASSESS II: Performed by: UROLOGY

## 2018-04-14 PROCEDURE — 25000566 ZZH SEVOFLURANE, EA 15 MIN: Performed by: UROLOGY

## 2018-04-14 PROCEDURE — 37000008 ZZH ANESTHESIA TECHNICAL FEE, 1ST 30 MIN: Performed by: UROLOGY

## 2018-04-14 PROCEDURE — 25000128 H RX IP 250 OP 636: Performed by: NURSE ANESTHETIST, CERTIFIED REGISTERED

## 2018-04-14 PROCEDURE — 25000128 H RX IP 250 OP 636: Performed by: PHYSICIAN ASSISTANT

## 2018-04-14 PROCEDURE — C2617 STENT, NON-COR, TEM W/O DEL: HCPCS | Performed by: UROLOGY

## 2018-04-14 PROCEDURE — C1769 GUIDE WIRE: HCPCS | Performed by: UROLOGY

## 2018-04-14 PROCEDURE — 71000012 ZZH RECOVERY PHASE 1 LEVEL 1 FIRST HR: Performed by: UROLOGY

## 2018-04-14 PROCEDURE — 85027 COMPLETE CBC AUTOMATED: CPT | Performed by: PHYSICIAN ASSISTANT

## 2018-04-14 PROCEDURE — 27210794 ZZH OR GENERAL SUPPLY STERILE: Performed by: UROLOGY

## 2018-04-14 PROCEDURE — 40000277 XR SURGERY CARM FLUORO LESS THAN 5 MIN W STILLS: Mod: TC

## 2018-04-14 PROCEDURE — 36000054 ZZH SURGERY LEVEL 2 W FLUORO 1ST 30 MIN: Performed by: UROLOGY

## 2018-04-14 PROCEDURE — 37000009 ZZH ANESTHESIA TECHNICAL FEE, EACH ADDTL 15 MIN: Performed by: UROLOGY

## 2018-04-14 PROCEDURE — 25000128 H RX IP 250 OP 636: Performed by: ANESTHESIOLOGY

## 2018-04-14 PROCEDURE — 25800025 ZZH RX 258: Performed by: UROLOGY

## 2018-04-14 PROCEDURE — 36415 COLL VENOUS BLD VENIPUNCTURE: CPT | Performed by: PHYSICIAN ASSISTANT

## 2018-04-14 PROCEDURE — 71000027 ZZH RECOVERY PHASE 2 EACH 15 MINS: Performed by: UROLOGY

## 2018-04-14 PROCEDURE — 27210995 ZZH RX 272: Performed by: UROLOGY

## 2018-04-14 PROCEDURE — 25000132 ZZH RX MED GY IP 250 OP 250 PS 637: Performed by: PHYSICIAN ASSISTANT

## 2018-04-14 PROCEDURE — 80048 BASIC METABOLIC PNL TOTAL CA: CPT | Performed by: PHYSICIAN ASSISTANT

## 2018-04-14 PROCEDURE — 74420 UROGRAPHY RTRGR +-KUB: CPT | Mod: 26 | Performed by: UROLOGY

## 2018-04-14 PROCEDURE — 52332 CYSTOSCOPY AND TREATMENT: CPT | Mod: LT | Performed by: UROLOGY

## 2018-04-14 PROCEDURE — G0378 HOSPITAL OBSERVATION PER HR: HCPCS

## 2018-04-14 DEVICE — STENT URETERAL DBL PIGTAIL INLAY 6FRX24CM 778624
Type: IMPLANTABLE DEVICE | Site: URETER | Status: NON-FUNCTIONAL
Removed: 2018-04-20

## 2018-04-14 RX ORDER — SODIUM CHLORIDE, SODIUM LACTATE, POTASSIUM CHLORIDE, CALCIUM CHLORIDE 600; 310; 30; 20 MG/100ML; MG/100ML; MG/100ML; MG/100ML
INJECTION, SOLUTION INTRAVENOUS CONTINUOUS
Status: DISCONTINUED | OUTPATIENT
Start: 2018-04-14 | End: 2018-04-14 | Stop reason: HOSPADM

## 2018-04-14 RX ORDER — PROMETHAZINE HYDROCHLORIDE 25 MG/ML
6.25 INJECTION, SOLUTION INTRAMUSCULAR; INTRAVENOUS
Status: DISCONTINUED | OUTPATIENT
Start: 2018-04-14 | End: 2018-04-14 | Stop reason: HOSPADM

## 2018-04-14 RX ORDER — LEVOTHYROXINE SODIUM 100 UG/1
100 TABLET ORAL DAILY
Status: DISCONTINUED | OUTPATIENT
Start: 2018-04-14 | End: 2018-04-14 | Stop reason: HOSPADM

## 2018-04-14 RX ORDER — ONDANSETRON 4 MG/1
4 TABLET, ORALLY DISINTEGRATING ORAL EVERY 30 MIN PRN
Status: DISCONTINUED | OUTPATIENT
Start: 2018-04-14 | End: 2018-04-14 | Stop reason: HOSPADM

## 2018-04-14 RX ORDER — FENTANYL CITRATE 50 UG/ML
25-50 INJECTION, SOLUTION INTRAMUSCULAR; INTRAVENOUS
Status: DISCONTINUED | OUTPATIENT
Start: 2018-04-14 | End: 2018-04-14 | Stop reason: HOSPADM

## 2018-04-14 RX ORDER — SENNOSIDES A AND B 8.6 MG/1
1 TABLET, FILM COATED ORAL 2 TIMES DAILY
Qty: 60 TABLET | Refills: 0 | Status: ON HOLD | OUTPATIENT
Start: 2018-04-14 | End: 2018-04-20

## 2018-04-14 RX ORDER — LIDOCAINE HYDROCHLORIDE 10 MG/ML
INJECTION, SOLUTION INFILTRATION; PERINEURAL PRN
Status: DISCONTINUED | OUTPATIENT
Start: 2018-04-14 | End: 2018-04-14

## 2018-04-14 RX ORDER — KETOROLAC TROMETHAMINE 15 MG/ML
15 INJECTION, SOLUTION INTRAMUSCULAR; INTRAVENOUS ONCE
Status: COMPLETED | OUTPATIENT
Start: 2018-04-14 | End: 2018-04-14

## 2018-04-14 RX ORDER — DEXAMETHASONE SODIUM PHOSPHATE 4 MG/ML
INJECTION, SOLUTION INTRA-ARTICULAR; INTRALESIONAL; INTRAMUSCULAR; INTRAVENOUS; SOFT TISSUE PRN
Status: DISCONTINUED | OUTPATIENT
Start: 2018-04-14 | End: 2018-04-14

## 2018-04-14 RX ORDER — TOLTERODINE 4 MG/1
4 CAPSULE, EXTENDED RELEASE ORAL DAILY PRN
Qty: 14 CAPSULE | Refills: 0 | Status: ON HOLD | OUTPATIENT
Start: 2018-04-14 | End: 2018-04-20

## 2018-04-14 RX ORDER — NALOXONE HYDROCHLORIDE 0.4 MG/ML
.1-.4 INJECTION, SOLUTION INTRAMUSCULAR; INTRAVENOUS; SUBCUTANEOUS
Status: DISCONTINUED | OUTPATIENT
Start: 2018-04-14 | End: 2018-04-14 | Stop reason: HOSPADM

## 2018-04-14 RX ORDER — METOPROLOL TARTRATE 1 MG/ML
1-2 INJECTION, SOLUTION INTRAVENOUS EVERY 5 MIN PRN
Status: DISCONTINUED | OUTPATIENT
Start: 2018-04-14 | End: 2018-04-14 | Stop reason: HOSPADM

## 2018-04-14 RX ORDER — LIDOCAINE 40 MG/G
CREAM TOPICAL
Status: DISCONTINUED | OUTPATIENT
Start: 2018-04-14 | End: 2018-04-14 | Stop reason: HOSPADM

## 2018-04-14 RX ORDER — GLYCOPYRROLATE 0.2 MG/ML
INJECTION, SOLUTION INTRAMUSCULAR; INTRAVENOUS PRN
Status: DISCONTINUED | OUTPATIENT
Start: 2018-04-14 | End: 2018-04-14

## 2018-04-14 RX ORDER — LIOTHYRONINE SODIUM 5 UG/1
10 TABLET ORAL DAILY
Status: DISCONTINUED | OUTPATIENT
Start: 2018-04-14 | End: 2018-04-14 | Stop reason: HOSPADM

## 2018-04-14 RX ORDER — ALBUTEROL SULFATE 0.83 MG/ML
2.5 SOLUTION RESPIRATORY (INHALATION) EVERY 4 HOURS PRN
Status: DISCONTINUED | OUTPATIENT
Start: 2018-04-14 | End: 2018-04-14 | Stop reason: HOSPADM

## 2018-04-14 RX ORDER — TAMSULOSIN HYDROCHLORIDE 0.4 MG/1
0.4 CAPSULE ORAL DAILY
Qty: 30 CAPSULE | Refills: 0 | Status: SHIPPED | OUTPATIENT
Start: 2018-04-14 | End: 2018-04-14

## 2018-04-14 RX ORDER — FENTANYL CITRATE 50 UG/ML
INJECTION, SOLUTION INTRAMUSCULAR; INTRAVENOUS PRN
Status: DISCONTINUED | OUTPATIENT
Start: 2018-04-14 | End: 2018-04-14

## 2018-04-14 RX ORDER — ONDANSETRON 2 MG/ML
4 INJECTION INTRAMUSCULAR; INTRAVENOUS EVERY 30 MIN PRN
Status: DISCONTINUED | OUTPATIENT
Start: 2018-04-14 | End: 2018-04-14 | Stop reason: HOSPADM

## 2018-04-14 RX ORDER — MEPERIDINE HYDROCHLORIDE 25 MG/ML
12.5 INJECTION INTRAMUSCULAR; INTRAVENOUS; SUBCUTANEOUS
Status: DISCONTINUED | OUTPATIENT
Start: 2018-04-14 | End: 2018-04-14 | Stop reason: HOSPADM

## 2018-04-14 RX ORDER — PROPOFOL 10 MG/ML
INJECTION, EMULSION INTRAVENOUS PRN
Status: DISCONTINUED | OUTPATIENT
Start: 2018-04-14 | End: 2018-04-14

## 2018-04-14 RX ADMIN — FENTANYL CITRATE 50 MCG: 50 INJECTION, SOLUTION INTRAMUSCULAR; INTRAVENOUS at 08:52

## 2018-04-14 RX ADMIN — ACETAMINOPHEN 650 MG: 325 TABLET ORAL at 05:50

## 2018-04-14 RX ADMIN — KETOROLAC TROMETHAMINE 15 MG: 15 INJECTION, SOLUTION INTRAMUSCULAR; INTRAVENOUS at 09:46

## 2018-04-14 RX ADMIN — PROPOFOL 200 MG: 10 INJECTION, EMULSION INTRAVENOUS at 08:52

## 2018-04-14 RX ADMIN — SODIUM CHLORIDE: 9 INJECTION, SOLUTION INTRAVENOUS at 03:41

## 2018-04-14 RX ADMIN — SODIUM CHLORIDE, POTASSIUM CHLORIDE, SODIUM LACTATE AND CALCIUM CHLORIDE: 600; 310; 30; 20 INJECTION, SOLUTION INTRAVENOUS at 08:26

## 2018-04-14 RX ADMIN — HYDROMORPHONE HYDROCHLORIDE 0.5 MG: 1 INJECTION, SOLUTION INTRAMUSCULAR; INTRAVENOUS; SUBCUTANEOUS at 02:05

## 2018-04-14 RX ADMIN — GENTAMICIN SULFATE 80 MG: 40 INJECTION, SOLUTION INTRAMUSCULAR; INTRAVENOUS at 09:00

## 2018-04-14 RX ADMIN — DEXAMETHASONE SODIUM PHOSPHATE 4 MG: 4 INJECTION, SOLUTION INTRA-ARTICULAR; INTRALESIONAL; INTRAMUSCULAR; INTRAVENOUS; SOFT TISSUE at 08:52

## 2018-04-14 RX ADMIN — LIDOCAINE HYDROCHLORIDE 30 MG: 10 INJECTION, SOLUTION INFILTRATION; PERINEURAL at 08:52

## 2018-04-14 RX ADMIN — FENTANYL CITRATE 50 MCG: 50 INJECTION, SOLUTION INTRAMUSCULAR; INTRAVENOUS at 09:00

## 2018-04-14 RX ADMIN — MIDAZOLAM 2 MG: 1 INJECTION INTRAMUSCULAR; INTRAVENOUS at 08:46

## 2018-04-14 RX ADMIN — ONDANSETRON 4 MG: 2 INJECTION INTRAMUSCULAR; INTRAVENOUS at 08:58

## 2018-04-14 RX ADMIN — SODIUM CHLORIDE, POTASSIUM CHLORIDE, SODIUM LACTATE AND CALCIUM CHLORIDE: 600; 310; 30; 20 INJECTION, SOLUTION INTRAVENOUS at 09:48

## 2018-04-14 RX ADMIN — CIPROFLOXACIN 400 MG: 2 INJECTION, SOLUTION INTRAVENOUS at 05:45

## 2018-04-14 RX ADMIN — GLYCOPYRROLATE 0.2 MG: 0.2 INJECTION, SOLUTION INTRAMUSCULAR; INTRAVENOUS at 08:52

## 2018-04-14 NOTE — DISCHARGE INSTRUCTIONS
STENT INFORMATION/DISCHARGE INSTRUCTIONS  [facility] / UROLOGY  DR. ANTONINO WATTS M.D.  CLINIC PHONE NUMBER:  [phone]    During surgery, a stent may be placed in the ureter.  The ureter is the tube that drains urine from the kidney to the bladder.  The stent is placed to dilate (open) the ureter so stone fragments can pass easily through the ureter or to decrease ureteral swelling after surgery or to relieve an obstruction.      The stent is made of silicone.  The upper end of the stent curls in the kidney while the lower end rests in the bladder.    While the stent is in place you may experience the following symptoms:  Blood and/or small blood clots in the urine  Bladder spasms (frequency and urgency of urination)  Discomfort or aching in the back or side where the stent is  Burning or discomfort at the end of urine stream    To decrease these symptoms you should:  Take antispasmodic medication as prescribed (Detrol, Ditropan, etc.)  Drink plenty of fluids but avoid caffeine and citrus (include cranberry)  If you are having discomfort in back or side, decrease activity    Please call your physician or the physician on call if you experience:  Fever greater than 101 degrees  Severe pain not relieved by pain medication or rest    Please make an appointment for the removal of the stent according to your physician's instructions.      CYSTOSCOPY DISCHARGE INSTRUCTIONS    YOU MAY GO BACK TO YOUR NORMAL DIET AND ACTIVITY, UNLESS YOUR DOCTOR TELLS YOU NOT TO.    FOR THE NEXT TWO DAYS, YOU MAY NOTICE:    SOME BLOOD IN YOUR URINE.  SOME BURNING WHEN YOU URINATE (USE THE TOILET).  AN URGE TO URINATE MORE OFTEN.  BLADDER SPASMS.    THESE ARE NORMAL AFTER THE PROCEDURE.  THEY SHOULD GO AWAY AFTER A DAY OR TWO.  TO RELIEVE THESE PROBLEMS:     DRINK 6 TO 8 LARGE GLASSES OF WATER EACH DAY (INCLUDES DRINKS AT MEALS).  THIS WILL HELP CLEAR THE URINE.    TAKE WARM BATHS TO RELIEVE PAIN AND BLADDER SPASMS.  DO NOT ADD  ANYTHING TO THE BATH WATER.    YOUR DOCTOR MAY PRESCRIBE PAIN MEDICINE.  YOU MAY ALSO TAKE TYLENOL (ACETAMINOPHEN) FOR PAIN.    CALL YOUR SURGEON IF YOU HAVE:    A FEVER OVER 100 DEGREES FOR MORE THAN A DAY.  CHECK YOUR TEMPERATURE UNDER YOUR TONGUE.    CHILLS.    FAILURE TO URINATE (NO URINE COMES OUT WHEN YOU TRY TO USE THE TOILET).  TRY SOAKING IN A BATHTUB FULL OF WARM WATER.  IF STILL NO URINE, CALL YOUR DOCTOR.    A LOT OF BLOOD IN THE URINE, OR BLOOD CLOTS LARGER THAN A NICKEL.      PAIN IN THE BACK OR BELLY AREA (ABDOMEN).    PAIN OR SPASMS THAT ARE NOT RELIEVED BY WARM TUB BATHS AND PAIN MEDICINE.      SEVERE PAIN, BURNING OR OTHER PROBLEMS WHILE PASSING URINE.    PAIN THAT GETS WORSE AFTER TWO DAYS.       GENERAL ANESTHESIA OR SEDATION ADULT DISCHARGE INSTRUCTIONS   SPECIAL PRECAUTIONS FOR 24 HOURS AFTER SURGERY    IT IS NOT UNUSUAL TO FEEL LIGHT-HEADED OR FAINT, UP TO 24 HOURS AFTER SURGERY OR WHILE TAKING PAIN MEDICATION.  IF YOU HAVE THESE SYMPTOMS; SIT FOR A FEW MINUTES BEFORE STANDING AND HAVE SOMEONE ASSIST YOU WHEN YOU GET UP TO WALK OR USE THE BATHROOM.    YOU SHOULD REST AND RELAX FOR THE NEXT 24 HOURS AND YOU MUST MAKE ARRANGEMENTS TO HAVE SOMEONE STAY WITH YOU FOR AT LEAST 24 HOURS AFTER YOUR DISCHARGE.  AVOID HAZARDOUS AND STRENUOUS ACTIVITIES.  DO NOT MAKE IMPORTANT DECISIONS FOR 24 HOURS.    DO NOT DRIVE ANY VEHICLE OR OPERATE MECHANICAL EQUIPMENT FOR 24 HOURS FOLLOWING THE END OF YOUR SURGERY.  EVEN THOUGH YOU MAY FEEL NORMAL, YOUR REACTIONS MAY BE AFFECTED BY THE MEDICATION YOU HAVE RECEIVED.    DO NOT DRINK ALCOHOLIC BEVERAGES FOR 24 HOURS FOLLOWING YOUR SURGERY.    DRINK CLEAR LIQUIDS (APPLE JUICE, GINGER ALE, 7-UP, BROTH, ETC.).  PROGRESS TO YOUR REGULAR DIET AS YOU FEEL ABLE.    YOU MAY HAVE A DRY MOUTH, A SORE THROAT, MUSCLES ACHES OR TROUBLE SLEEPING.  THESE SHOULD GO AWAY AFTER 24 HOURS.    CALL YOUR DOCTOR FOR ANY OF THE FOLLOWING:  SIGNS OF INFECTION (FEVER, GROWING TENDERNESS AT  THE SURGERY SITE, A LARGE AMOUNT OF DRAINAGE OR BLEEDING, SEVERE PAIN, FOUL-SMELLING DRAINAGE, REDNESS OR SWELLING.    IT HAS BEEN OVER 8 TO 10 HOURS SINCE SURGERY AND YOU ARE STILL NOT ABLE TO URINATE (PASS WATER).     You received Toradol, an IV form of ibuprofen (Motrin) at 9:30AM.  Do not take any ibuprofen products until 3:30PM.

## 2018-04-14 NOTE — ANESTHESIA PREPROCEDURE EVALUATION
PAC NOTE:       ANESTHESIA PRE EVALUATION:  Anesthesia Evaluation     .             ROS/MED HX    ENT/Pulmonary:  - neg pulmonary ROS     Neurologic:  - neg neurologic ROS     Cardiovascular:  - neg cardiovascular ROS       METS/Exercise Tolerance:     Hematologic:  - neg hematologic  ROS       Musculoskeletal:  - neg musculoskeletal ROS       GI/Hepatic:  - neg GI/hepatic ROS       Renal/Genitourinary: Comment: Renal lithiasis        Endo:  - neg endo ROS   (+) thyroid problem .      Psychiatric:  - neg psychiatric ROS       Infectious Disease:  - neg infectious disease ROS       Malignancy:         Other: Comment: .Lab Test        04/14/18     04/13/18     07/06/10                       0631          1025          0855          WBC          8.5          7.3           --           HGB          12.1         13.9         14.1          MCV          90           88            --           PLT          220          248           --            Lab Test        04/14/18 04/13/18                       0631          1025          NA           138          139           POTASSIUM    3.8          3.6           CHLORIDE     105          104           CO2          28           28            BUN          12           14            CR           0.99         0.85          ANIONGAP     5            7             ROGER          8.1*         8.9           GLC          134*         101*                              Physical Exam  Normal systems: cardiovascular and pulmonary    Airway   Mallampati: II    Dental     Cardiovascular   Rhythm and rate: regular and normal      Pulmonary    breath sounds clear to auscultation             Anesthesia Plan      History & Physical Review  History and physical reviewed and following examination; no interval change.    ASA Status:  2 .        Plan for General and LMA with Intravenous induction. Maintenance will be Inhalation and Balanced.    PONV prophylaxis:  Ondansetron (or other 5HT-3) and  Dexamethasone or Solumedrol       Postoperative Care  Postoperative pain management:  IV analgesics, Multi-modal analgesia and Oral pain medications.      Consents  Anesthetic plan, risks, benefits and alternatives discussed with:  Patient or representative..                            .

## 2018-04-14 NOTE — PLAN OF CARE
Problem: Patient Care Overview  Goal: Plan of Care/Patient Progress Review  Outcome: Improving  PRIMARY DIAGNOSIS: ACUTE RENAL COLIC    OUTPATIENT/OBSERVATION GOALS TO BE MET BEFORE DISCHARGE  1. Pain Status: Improved but still requiring IV narcotics.    2. Tolerating adequate PO diet: No    3. Surgical Intervention planned: Yes    4. Cleared by consultants (if involved): No- Urology following     5. Return to near baseline physical activity: Yes    Discharge Planner Nurse   Safe discharge environment identified: Yes  Barriers to discharge: Yes- OR at 0800        Entered by: Jenna Davis 04/14/2018 2:40 AM     Please review provider order for any additional goals.   Nurse to notify provider when observation goals have been met and patient is ready for discharge.    Patient has OR time at 0800. 10 mg oxy given at 2100. Dilaudid given in IV at 1830. Denies nausea. NPO at midnight. NS running at 150. Patient straining urine. Vitals stable.  available to help care for after discharge. Plan for cipro q12 hours.

## 2018-04-14 NOTE — ANESTHESIA CARE TRANSFER NOTE
Patient: Jennie Davison    Procedure(s):  Cystoscopy, left retrograde, left stent placement - Wound Class: II-Clean Contaminated    Diagnosis: left stone  Diagnosis Additional Information: No value filed.    Anesthesia Type:   General, LMA     Note:  Airway :Face Mask  Patient transferred to:PACU  Comments: Pt sv good tidal volumes, awake LMA removed prepare to transfer to PACU,  Report to PACU RN.  VSS transfer careHandoff Report: Identifed the Patient, Identified the Reponsible Provider, Reviewed the pertinent medical history, Discussed the surgical course, Reviewed Intra-OP anesthesia mangement and issues during anesthesia, Set expectations for post-procedure period and Allowed opportunity for questions and acknowledgement of understanding      Vitals: (Last set prior to Anesthesia Care Transfer)    CRNA VITALS  4/14/2018 0843 - 4/14/2018 0918      4/14/2018             Pulse: 105    SpO2: 100 %    Resp Rate (observed): (!)  6                Electronically Signed By: MIRLANDE Singh CRNA  April 14, 2018  9:18 AM

## 2018-04-14 NOTE — OP NOTE
PREOPERATIVE DIAGNOSIS:  Left ureteral calculus    POSTOPERATIVE DIAGNOSIS:  Same    PROCEDURES PERFORMED:   1. Cystourethroscopy with left retrograde pyelography  2. Placement of left ureteral stent.  3. Intraoperative interpretation of fluoroscopic imaging.     STAFF SURGEON: Kelechi Burt MD    RESIDENT: Lj Dewey MD    ANESTHESIA: MAC    ESTIMATED BLOOD LOSS: 1 mL.     DRAINS:  6F x 24cm left ureteral stent     OPERATIVE INDICATIONS:   Jennie Davison is a 52 year old female with a symptomatic left ureteral calculus. After a discussion of options she chose to proceed with the above procedures.    DESCRIPTION OF PROCEDURE:    After informed consent was obtained, the patient was taken to the operating room, and moved to the operating table.  After adequate anesthesia was induced, the patient was repositioned in dorsal lithotomy position and prepped and draped in the usual sterile fashion. A timeout was taken to confirm correct patient, procedure and laterality.     A 22-[place] cystoscope was inserted into a well lubricated urethra. The urethra was unremarkable. The bladder was free of tumors, stones or diverticuli.  The media was clear.  The bilateral ureteral orifices were orthotopic.  A guidewire was advanced into the left renal pelvis with the aid of a 5-[place] open ended ureteral catheter.  A retrograde pyelogram demonstrated left hydronephrosis or filling defects.  The wire was replaced and a 6-[place] 24 cm double-J stent was advanced over the guidewire under fluoroscopic guidance with a good curl in the renal pelvis and bladder.  The stent passed up easily with no appreciable resistance.  The bladder was then drained.    The patient tolerated the procedure well.  There were no complications.      PLAN: Follow up in 1-2 weeks for definitive ureteroscopy. Message sent to Raya Sorto to schedule appt.       Lj Dewey MD  Urology Resident

## 2018-04-14 NOTE — PLAN OF CARE
Problem: Patient Care Overview  Goal: Plan of Care/Patient Progress Review  Outcome: No Change  Problem: Patient Care Overview  Goal: Individualization & Mutuality  Outcome: No Change  PRIMARY DIAGNOSIS: PAIN; Moderate Left Hydronephrosis 2* (L) Ureteral Stone  OUTPATIENT/OBSERVATION GOALS TO BE MET BEFORE DISCHARGE:  1. Pain Status: Improved but still requiring IV narcotics.      2. Return to near baseline physical activity: No, constipation & intermittent nausea persisting     3. Cleared for discharge by consultants (if involved): No; has not passed any stones      Discharge Planner Nurse   Safe discharge environment identified: No; constipation with intermittent nausea & persist intermittent pain  Barriers to discharge: Yes; has not passed Any stones in urine        Entered by: LEORA RODRIGUEZ 04/13/2018 5:20 PM  Please review provider order for any additional goals.   Nurse to notify provider when observation goals have been met and patient is ready for discharge.

## 2018-04-14 NOTE — DISCHARGE SUMMARY
[facility]  Discharge Summary        Jennie Davison MRN# [id number]   YOB: 1965 Age: 52 year old     Date of Admission:  4/13/2018  Date of Discharge:  4/14/2018  Admitting Physician:  Ezequiel Rodgers MD  Discharge Physician: Kandy Srivastava PA-C  Discharging Service: Hospitalist      Primary Provider: Pallas, Kenneth G  Primary Care Physician Phone Number: [phone]         Primary Discharge Diagnoses:    Jennie Davison was admitted on 4/13/2018 for acute renal colic.     1. Acute renal colic: sxs improved.  2. S/p Lt ureteral stent placement    # Discharge Pain Plan:   - During her hospitalization, Jennie experienced pain due to stent placement and renal stone.  - Pain medications per discharge orders        Secondary Discharge Diagnoses:   History reviewed. No pertinent past medical history.           Code Status:      Full Code        Brief Hospital Summary:         Please refer to initial admission history and physical for further details.   Briefly, Jennie Davison was admitted on 4/13/2018 with acute renal colic. Initial work up in the ED did not reveal evidence of grossly infected stone or significant renal failure/ATN to require inpatient hospitalization. Pt was registered to the Observation Unit for pain control and supportive measures. Pt was resuscitated with IVF, and anti-emetics. Urology consult was obtained and patient underwent stent placement on 4/14/2018. Pain control was transitioned from IV to PO form. Labs reviewed and significant results addressed. On the day of discharge, pt's pain was controlled and was able to tolerate PO intake. Medications were reviewed and adjustments made as necessary. Pt is instructed to follow up as below.             Significant Lab During Hospitalization:        Recent Labs  Lab 04/14/18  0631 04/13/18  1025   WBC 8.5 7.3   HGB 12.1 13.9   HCT 36.9 41.6   MCV 90 88    248       Recent Labs  Lab 04/14/18  0631 04/13/18  1025     139   POTASSIUM 3.8 3.6   CHLORIDE 105 104   CO2 28 28   ANIONGAP 5 7   * 101*   BUN 12 14   CR 0.99 0.85   GFRESTIMATED 59* 70   GFRESTBLACK 71 85   ROGER 8.1* 8.9       Recent Labs  Lab 04/13/18  1010   COLOR Yellow   APPEARANCE Slightly Cloudy   URINEGLC Negative   URINEBILI Negative   URINEKETONE Negative   SG 1.013   UBLD Small*   URINEPH 5.0   PROTEIN Negative   NITRITE Negative   LEUKEST Trace*   RBCU 14*   WBCU 7*                Significant Imaging During Hospitalization:      Recent Results (from the past 48 hour(s))   KUB XR    Narrative    XR KUB 4/13/2018 11:09 AM    HISTORY: Pain.    COMPARISON: None.      Impression    IMPRESSION: No radiopaque urinary calcifications appreciated. Marked  stool and gas-filled colon and rectum.    YANIV INGRMA MD   Retroperitoneal US    Narrative    US RENAL COMPLETE 4/13/2018 11:11 AM     HISTORY: Left flank pain. History of distal ureteral stone 4/9/18.      FINDINGS: I suspect incidental fatty liver infiltration. There appear  to be multiple right renal stones, with the largest measuring 0.8 cm.  There is a 1.7 cm right renal cyst. There are multiple left renal  cysts, with the largest measuring 1.7 cm. There is a 2.5 cm left renal  cyst. There is moderate left hydronephrosis . A fluid-filled bladder  is not seen      Impression    IMPRESSION:  Moderate left hydronephrosis.    ROHIT SPRAGUE MD   XR Surgery RADHA L/T 5 Min Fluoro w Stills    Narrative    This exam was marked as non-reportable because it will not be read by a   radiologist or a [place] non-radiologist provider.                          Pending Results:        Unresulted Labs Ordered in the Past 30 Days of this Admission     No orders found for last 61 day(s).              Consultations This Hospital Stay:      Consultation during this admission received from urology         Discharge Instructions and Follow-Up:        Pt instructed to follow up with PCP and urologist in 1-2 weeks.         "Discharge Disposition:      Discharged to home         Discharge Medications:        Current Discharge Medication List      START taking these medications    Details   !! tamsulosin (FLOMAX) 0.4 MG capsule Take 1 capsule (0.4 mg) by mouth daily  Qty: 30 capsule, Refills: 0    Associated Diagnoses: Renal colic      tolterodine (DETROL LA) 4 MG 24 hr capsule Take 1 capsule (4 mg) by mouth daily as needed For bladder spasms and stent discomfort  Qty: 14 capsule, Refills: 0    Associated Diagnoses: Renal colic       !! - Potential duplicate medications found. Please discuss with provider.      CONTINUE these medications which have NOT CHANGED    Details   IBUPROFEN PO Take 200-400 mg by mouth every 6 hours as needed for moderate pain      Acetaminophen (TYLENOL PO) Take 325-650 mg by mouth every 6 hours as needed for mild pain or fever      Levothyroxine Sodium (SYNTHROID PO) Take 100 mcg by mouth daily      LIOTHYRONINE SODIUM PO Take 10 mcg by mouth daily      CIPROFLOXACIN PO Take 250 mg by mouth 2 times daily Take for 5 days. Started on 4/10/18      oxyCODONE-acetaminophen (PERCOCET) 5-325 MG per tablet Take 1-2 tablets by mouth every 4 hours as needed      !! tamsulosin (FLOMAX) 0.4 MG capsule Take 0.4 mg by mouth daily (received a 20 day supply)       !! - Potential duplicate medications found. Please discuss with provider.              Allergies:       No Known Allergies        Condition and Physical on Discharge:      Discharge condition: Stable   Vitals: Blood pressure (!) 146/92, pulse 71, temperature 98.3  F (36.8  C), temperature source Temporal, resp. rate 14, height 1.727 m (5' 8\"), weight 81.6 kg (180 lb), SpO2 98 %.  180 lbs 0 oz      Unable to exam patient today as she was already in surgery upon my arrival to the floor.    Kandy Srivastava PA-C  "

## 2018-04-14 NOTE — ANESTHESIA POSTPROCEDURE EVALUATION
Patient: Jennie Davison    Procedure(s):  Cystoscopy, left retrograde, left stent placement - Wound Class: II-Clean Contaminated    Diagnosis:left stone  Diagnosis Additional Information: Left ureteral calculus    Anesthesia Type:  General, LMA    Note:  Anesthesia Post Evaluation    Patient location during evaluation: PACU  Patient participation: Able to fully participate in evaluation  Level of consciousness: awake  Pain management: adequate  Airway patency: patent  Cardiovascular status: acceptable  Respiratory status: acceptable  Hydration status: acceptable  PONV: controlled     Anesthetic complications: None          Last vitals:  Vitals:    04/14/18 0339 04/14/18 0729 04/14/18 0918   BP: 121/74 133/82 136/76   Pulse: 71     Resp: 17 14 11   Temp: 96.7  F (35.9  C) 98.4  F (36.9  C) 98.7  F (37.1  C)   SpO2: 94% 98% 100%         Electronically Signed By: Sheldon Ruiz DO  April 14, 2018  9:34 AM

## 2018-04-14 NOTE — PLAN OF CARE
Problem: Patient Care Overview  Goal: Plan of Care/Patient Progress Review  PRIMARY DIAGNOSIS: ACUTE RENAL COLIC     OUTPATIENT/OBSERVATION GOALS TO BE MET BEFORE DISCHARGE  1. Pain Status: Improved but still requiring IV narcotics.     2. Tolerating adequate PO diet: No     3. Surgical Intervention planned: Yes     4. Cleared by consultants (if involved): No- Urology following      5. Return to near baseline physical activity: Yes     Discharge Planner Nurse   Safe discharge environment identified: Yes  Barriers to discharge: Yes- OR at 0800        Entered by: Jenna Davis 04/14/2018 2:40 AM  Please review provider order for any additional goals.   Nurse to notify provider when observation goals have been met and patient is ready for discharge.     Patient has OR time at 0800. 10 mg oxy given at 2100. IV dilaudid given at 0200.  Denies nausea. NPO at midnight. NS running at 150. Patient straining urine. Vitals stable.  available to help care for after discharge. Plan for cipro q12 hours.

## 2018-04-19 NOTE — H&P (VIEW-ONLY)
History and Physical     Jennie Davison MRN# [id number]   YOB: 1965 Age: 52 year old      Date of Admission:  4/13/2018    Primary care provider: Pallas, Kenneth G          Assessment and Plan:   Jennie Davison is a 52 year old female with a PMH significant for migraines, kidney stone and thyroid cancer s/p thyroidectomy  who presents with continued LUQ/flank pain.    Patient was discussed with Dr. Reyes, who was provider in ED. Chart review of ED work up was reviewed as well as chart review of Care Everywhere, previous visits and admissions.     1.  Left-sided nephrolithiasis with hydronephrosis  Patient was diagnosed with a 4 mm stone on Monday at [facility].  She was admitted overnight for high rate fluids, Flomax, pain control and antibiotics.  The following day her pain was gone so she was discharged home but unfortunately her pain returned in the exact same area.  The pain has become more constant and severe in nature.  Repeat imaging today does not show the stone on KUB x-ray.  But does show a fair amount of hydronephrosis in the left kidney suggestive of blockage.  Her white blood cell count is normal, she does not have fever and her UA does not appear infected.  Dr. Lyons was called from the ED and plans to see her.   -Plan for IV pain and nausea control  -High rate fluids at 150 mL/h  -Flomax  -Strain urine  -Continue ciprofloxacin 500 mg twice daily, this is planned to continue through Saturday  -Urology consultation    2.  History of thyroid cancer status post thyroidectomy  Continue liothyronine and levothyroxine      Social: No concerns  Code: Discussed with patient and they have chosen Full code  VTE prophylaxis: Ambulation  Disposition: Observation                    Chief Complaint:   LUQ/flank pain         History of Present Illness:   Jennie Davison is a 52 year old female who presents with continued left upper quadrant/ flank pain.  She states that she noticed pain on  Monday that was intermittent in nature and very severe.  She presented to [facility] and was found to have a 4 mm stone.  She was admitted with high rate fluids, Flomax and IV antibiotics.  The following morning she did not have pain so she was discharged home on Flomax, oxycodone, and ciprofloxacin.  She reports that since discharge she has continued to have left upper quadrant and left flank pain that was intermittent but became more continuous today.  She denies hematuria, fever and chills.  Previously she has related her stones to the use of Topamax.  She was recently restarted on this by her primary care doctor for her migraines.  She is planning to meet with [facility] regarding her headaches in the near future.  She denies nausea, vomiting, chest pain, shortness of breath, cough, and diarrhea.             Past Medical History:   Migraine  Thyroid cancer          Past Surgical History:   Thyroidectomy            Social History:     Social History     Social History     Marital status:      Spouse name: N/A     Number of children: N/A     Years of education: N/A     Occupational History     Not on file.     Social History Main Topics     Smoking status: Not on file     Smokeless tobacco: Not on file     Alcohol use Not on file     Drug use: Not on file     Sexual activity: Not on file     Other Topics Concern     Not on file     Social History Narrative               Family History:   Family history reviewed and is non contributory         Allergies:    No Known Allergies            Medications:     Prior to Admission medications    Medication Sig Last Dose Taking? Auth Provider   IBUPROFEN PO Take 200-400 mg by mouth every 6 hours as needed for moderate pain 4/13/2018 at Unknown time Yes Unknown, Entered By History   Acetaminophen (TYLENOL PO) Take 325-650 mg by mouth every 6 hours as needed for mild pain or fever 4/13/2018 at Unknown time Yes Unknown, Entered By History   Levothyroxine Sodium  "(SYNTHROID PO) Take 100 mcg by mouth daily 4/13/2018 at Unknown time Yes Unknown, Entered By History   LIOTHYRONINE SODIUM PO Take 10 mcg by mouth daily 4/13/2018 at Unknown time Yes Unknown, Entered By History   CIPROFLOXACIN PO Take 250 mg by mouth 2 times daily Take for 5 days. Started on 4/10/18 4/13/2018 at Unknown time Yes Unknown, Entered By History   oxyCODONE-acetaminophen (PERCOCET) 5-325 MG per tablet Take 1-2 tablets by mouth every 4 hours as needed 4/13/2018 at Unknown time Yes Unknown, Entered By History   tamsulosin (FLOMAX) 0.4 MG capsule Take 0.4 mg by mouth daily (received a 20 day supply) 4/12/2018 at noon Yes Unknown, Entered By History              Review of Systems:   A Comprehensive greater than 10 system review of systems was carried out.  Pertinent positives and negatives are noted above.  Otherwise negative for contributory information.            Physical Exam:   Blood pressure 129/78, pulse 85, temperature 96.8  F (36  C), temperature source Oral, resp. rate 20, height 1.727 m (5' 8\"), weight 81.6 kg (180 lb), SpO2 95 %.  Exam:  GENERAL:  Comfortable.  PSYCH: pleasant, oriented, No acute distress.  HEENT:  PERRLA. Normal conjunctiva, normal hearing, nasal mucosa and Oropharynx are normal.  NECK:  Supple, no neck vein distention, adenopathy or bruits, normal thyroid.  HEART:  Normal S1, S2 with no murmur, no pericardial rub, gallops or S3 or S4.  LUNGS:  Clear to auscultation, normal Respiratory effort. No wheezing, rales or ronchi.  ABDOMEN:  Soft, no hepatosplenomegaly, normal bowel sounds. Tender LUQ and CVA tenderness noted.  EXTREMITIES:  No pedal edema, +2 pulses bilateral and equal.  SKIN:  Dry to touch, No rash, wound or ulcerations.  NEUROLOGIC:  CN 2-12 intact,  sensation is intact with no focal deficits.               Data:       Recent Labs  Lab 04/13/18  1025   WBC 7.3   HGB 13.9   HCT 41.6   MCV 88          Recent Labs  Lab 04/13/18  1025      POTASSIUM 3.6 "   CHLORIDE 104   CO2 28   ANIONGAP 7   *   BUN 14   CR 0.85   GFRESTIMATED 70   GFRESTBLACK 85   ROGER 8.9         Recent Results (from the past 24 hour(s))   KUB XR    Narrative    XR KUB 4/13/2018 11:09 AM    HISTORY: Pain.    COMPARISON: None.      Impression    IMPRESSION: No radiopaque urinary calcifications appreciated. Marked  stool and gas-filled colon and rectum.    YANIV INGRAM MD   Retroperitoneal US    Narrative    US RENAL COMPLETE 4/13/2018 11:11 AM     HISTORY: Left flank pain. History of distal ureteral stone 4/9/18.      FINDINGS: I suspect incidental fatty liver infiltration. There appear  to be multiple right renal stones, with the largest measuring 0.8 cm.  There is a 1.7 cm right renal cyst. There are multiple left renal  cysts, with the largest measuring 1.7 cm. There is a 2.5 cm left renal  cyst. There is moderate left hydronephrosis . A fluid-filled bladder  is not seen      Impression    IMPRESSION:  Moderate left hydronephrosis.    MD Jennifer PICKERING PA-C

## 2018-04-20 ENCOUNTER — ANESTHESIA (OUTPATIENT)
Dept: SURGERY | Facility: CLINIC | Age: 53
End: 2018-04-20
Payer: COMMERCIAL

## 2018-04-20 ENCOUNTER — HOSPITAL ENCOUNTER (OUTPATIENT)
Facility: CLINIC | Age: 53
Discharge: HOME | End: 2018-04-20
Attending: UROLOGY | Admitting: UROLOGY
Payer: COMMERCIAL

## 2018-04-20 ENCOUNTER — APPOINTMENT (OUTPATIENT)
Dept: GENERAL RADIOLOGY | Facility: CLINIC | Age: 53
End: 2018-04-20
Attending: UROLOGY
Payer: COMMERCIAL

## 2018-04-20 ENCOUNTER — SURGERY (OUTPATIENT)
Age: 53
End: 2018-04-20

## 2018-04-20 ENCOUNTER — ANESTHESIA EVENT (OUTPATIENT)
Dept: SURGERY | Facility: CLINIC | Age: 53
End: 2018-04-20
Payer: COMMERCIAL

## 2018-04-20 VITALS
OXYGEN SATURATION: 100 % | HEIGHT: 68 IN | WEIGHT: 178 LBS | RESPIRATION RATE: 14 BRPM | HEART RATE: 95 BPM | DIASTOLIC BLOOD PRESSURE: 76 MMHG | TEMPERATURE: 97.8 F | SYSTOLIC BLOOD PRESSURE: 103 MMHG | BODY MASS INDEX: 26.98 KG/M2

## 2018-04-20 LAB
COPATH REPORT: NORMAL
HCG UR QL: NEGATIVE

## 2018-04-20 PROCEDURE — 71000027 ZZH RECOVERY PHASE 2 EACH 15 MINS: Performed by: UROLOGY

## 2018-04-20 PROCEDURE — C1769 GUIDE WIRE: HCPCS | Performed by: UROLOGY

## 2018-04-20 PROCEDURE — 27210794 ZZH OR GENERAL SUPPLY STERILE: Performed by: UROLOGY

## 2018-04-20 PROCEDURE — 36000058 ZZH SURGERY LEVEL 3 EA 15 ADDTL MIN: Performed by: UROLOGY

## 2018-04-20 PROCEDURE — 88300 SURGICAL PATH GROSS: CPT | Mod: 26 | Performed by: UROLOGY

## 2018-04-20 PROCEDURE — 25000128 H RX IP 250 OP 636: Performed by: NURSE ANESTHETIST, CERTIFIED REGISTERED

## 2018-04-20 PROCEDURE — 36000060 ZZH SURGERY LEVEL 3 W FLUORO 1ST 30 MIN: Performed by: UROLOGY

## 2018-04-20 PROCEDURE — 88300 SURGICAL PATH GROSS: CPT | Performed by: UROLOGY

## 2018-04-20 PROCEDURE — 25000125 ZZHC RX 250: Performed by: NURSE ANESTHETIST, CERTIFIED REGISTERED

## 2018-04-20 PROCEDURE — 25800025 ZZH RX 258: Performed by: UROLOGY

## 2018-04-20 PROCEDURE — 25000566 ZZH SEVOFLURANE, EA 15 MIN: Performed by: UROLOGY

## 2018-04-20 PROCEDURE — 25000128 H RX IP 250 OP 636: Performed by: ANESTHESIOLOGY

## 2018-04-20 PROCEDURE — 27210995 ZZH RX 272: Performed by: UROLOGY

## 2018-04-20 PROCEDURE — 71000012 ZZH RECOVERY PHASE 1 LEVEL 1 FIRST HR: Performed by: UROLOGY

## 2018-04-20 PROCEDURE — 37000008 ZZH ANESTHESIA TECHNICAL FEE, 1ST 30 MIN: Performed by: UROLOGY

## 2018-04-20 PROCEDURE — 74420 UROGRAPHY RTRGR +-KUB: CPT | Mod: 26 | Performed by: UROLOGY

## 2018-04-20 PROCEDURE — 40000306 ZZH STATISTIC PRE PROC ASSESS II: Performed by: UROLOGY

## 2018-04-20 PROCEDURE — 52353 CYSTOURETERO W/LITHOTRIPSY: CPT | Mod: LT | Performed by: UROLOGY

## 2018-04-20 PROCEDURE — 25000128 H RX IP 250 OP 636: Performed by: UROLOGY

## 2018-04-20 PROCEDURE — 81025 URINE PREGNANCY TEST: CPT | Performed by: ANESTHESIOLOGY

## 2018-04-20 PROCEDURE — 82365 CALCULUS SPECTROSCOPY: CPT | Performed by: UROLOGY

## 2018-04-20 PROCEDURE — 37000009 ZZH ANESTHESIA TECHNICAL FEE, EACH ADDTL 15 MIN: Performed by: UROLOGY

## 2018-04-20 PROCEDURE — 40000278 XR SURGERY CARM FLUORO LESS THAN 5 MIN: Mod: TC

## 2018-04-20 RX ORDER — NALOXONE HYDROCHLORIDE 0.4 MG/ML
.1-.4 INJECTION, SOLUTION INTRAMUSCULAR; INTRAVENOUS; SUBCUTANEOUS
Status: DISCONTINUED | OUTPATIENT
Start: 2018-04-20 | End: 2018-04-20 | Stop reason: HOSPADM

## 2018-04-20 RX ORDER — FENTANYL CITRATE 50 UG/ML
INJECTION, SOLUTION INTRAMUSCULAR; INTRAVENOUS PRN
Status: DISCONTINUED | OUTPATIENT
Start: 2018-04-20 | End: 2018-04-20

## 2018-04-20 RX ORDER — CEFAZOLIN SODIUM 2 G/100ML
2 INJECTION, SOLUTION INTRAVENOUS
Status: COMPLETED | OUTPATIENT
Start: 2018-04-20 | End: 2018-04-20

## 2018-04-20 RX ORDER — KETOROLAC TROMETHAMINE 30 MG/ML
INJECTION, SOLUTION INTRAMUSCULAR; INTRAVENOUS PRN
Status: DISCONTINUED | OUTPATIENT
Start: 2018-04-20 | End: 2018-04-20

## 2018-04-20 RX ORDER — ONDANSETRON 2 MG/ML
4 INJECTION INTRAMUSCULAR; INTRAVENOUS EVERY 30 MIN PRN
Status: DISCONTINUED | OUTPATIENT
Start: 2018-04-20 | End: 2018-04-20 | Stop reason: HOSPADM

## 2018-04-20 RX ORDER — HYDROMORPHONE HYDROCHLORIDE 1 MG/ML
.3-.5 INJECTION, SOLUTION INTRAMUSCULAR; INTRAVENOUS; SUBCUTANEOUS EVERY 10 MIN PRN
Status: DISCONTINUED | OUTPATIENT
Start: 2018-04-20 | End: 2018-04-20 | Stop reason: HOSPADM

## 2018-04-20 RX ORDER — ONDANSETRON 4 MG/1
4 TABLET, ORALLY DISINTEGRATING ORAL EVERY 30 MIN PRN
Status: DISCONTINUED | OUTPATIENT
Start: 2018-04-20 | End: 2018-04-20 | Stop reason: HOSPADM

## 2018-04-20 RX ORDER — SODIUM CHLORIDE, SODIUM LACTATE, POTASSIUM CHLORIDE, CALCIUM CHLORIDE 600; 310; 30; 20 MG/100ML; MG/100ML; MG/100ML; MG/100ML
INJECTION, SOLUTION INTRAVENOUS CONTINUOUS
Status: DISCONTINUED | OUTPATIENT
Start: 2018-04-20 | End: 2018-04-20 | Stop reason: HOSPADM

## 2018-04-20 RX ORDER — CEFAZOLIN SODIUM 1 G/3ML
1 INJECTION, POWDER, FOR SOLUTION INTRAMUSCULAR; INTRAVENOUS SEE ADMIN INSTRUCTIONS
Status: DISCONTINUED | OUTPATIENT
Start: 2018-04-20 | End: 2018-04-20 | Stop reason: HOSPADM

## 2018-04-20 RX ORDER — LIDOCAINE 40 MG/G
CREAM TOPICAL
Status: DISCONTINUED | OUTPATIENT
Start: 2018-04-20 | End: 2018-04-20 | Stop reason: HOSPADM

## 2018-04-20 RX ORDER — LIDOCAINE HYDROCHLORIDE 10 MG/ML
INJECTION, SOLUTION INFILTRATION; PERINEURAL PRN
Status: DISCONTINUED | OUTPATIENT
Start: 2018-04-20 | End: 2018-04-20

## 2018-04-20 RX ORDER — ONDANSETRON 2 MG/ML
INJECTION INTRAMUSCULAR; INTRAVENOUS PRN
Status: DISCONTINUED | OUTPATIENT
Start: 2018-04-20 | End: 2018-04-20

## 2018-04-20 RX ORDER — LABETALOL HYDROCHLORIDE 5 MG/ML
10 INJECTION, SOLUTION INTRAVENOUS
Status: DISCONTINUED | OUTPATIENT
Start: 2018-04-20 | End: 2018-04-20 | Stop reason: HOSPADM

## 2018-04-20 RX ORDER — MEPERIDINE HYDROCHLORIDE 50 MG/ML
12.5 INJECTION INTRAMUSCULAR; INTRAVENOUS; SUBCUTANEOUS
Status: DISCONTINUED | OUTPATIENT
Start: 2018-04-20 | End: 2018-04-20 | Stop reason: HOSPADM

## 2018-04-20 RX ORDER — FENTANYL CITRATE 50 UG/ML
25-50 INJECTION, SOLUTION INTRAMUSCULAR; INTRAVENOUS
Status: DISCONTINUED | OUTPATIENT
Start: 2018-04-20 | End: 2018-04-20 | Stop reason: HOSPADM

## 2018-04-20 RX ORDER — DEXAMETHASONE SODIUM PHOSPHATE 4 MG/ML
INJECTION, SOLUTION INTRA-ARTICULAR; INTRALESIONAL; INTRAMUSCULAR; INTRAVENOUS; SOFT TISSUE PRN
Status: DISCONTINUED | OUTPATIENT
Start: 2018-04-20 | End: 2018-04-20

## 2018-04-20 RX ORDER — PROPOFOL 10 MG/ML
INJECTION, EMULSION INTRAVENOUS PRN
Status: DISCONTINUED | OUTPATIENT
Start: 2018-04-20 | End: 2018-04-20

## 2018-04-20 RX ADMIN — KETOROLAC TROMETHAMINE 30 MG: 30 INJECTION, SOLUTION INTRAMUSCULAR at 08:04

## 2018-04-20 RX ADMIN — SODIUM CHLORIDE, POTASSIUM CHLORIDE, SODIUM LACTATE AND CALCIUM CHLORIDE: 600; 310; 30; 20 INJECTION, SOLUTION INTRAVENOUS at 06:59

## 2018-04-20 RX ADMIN — FENTANYL CITRATE 25 MCG: 50 INJECTION, SOLUTION INTRAMUSCULAR; INTRAVENOUS at 07:55

## 2018-04-20 RX ADMIN — FENTANYL CITRATE 100 MCG: 50 INJECTION, SOLUTION INTRAMUSCULAR; INTRAVENOUS at 07:29

## 2018-04-20 RX ADMIN — WATER 1500 ML: 100 IRRIGANT IRRIGATION at 07:55

## 2018-04-20 RX ADMIN — WATER 30 ML GIVEN: 100 IRRIGANT IRRIGATION at 07:55

## 2018-04-20 RX ADMIN — PROPOFOL 160 MG: 10 INJECTION, EMULSION INTRAVENOUS at 07:34

## 2018-04-20 RX ADMIN — CEFAZOLIN SODIUM 2 G: 2 INJECTION, SOLUTION INTRAVENOUS at 07:40

## 2018-04-20 RX ADMIN — DEXAMETHASONE SODIUM PHOSPHATE 6 MG: 4 INJECTION, SOLUTION INTRA-ARTICULAR; INTRALESIONAL; INTRAMUSCULAR; INTRAVENOUS; SOFT TISSUE at 07:35

## 2018-04-20 RX ADMIN — LIDOCAINE HYDROCHLORIDE 50 MG: 10 INJECTION, SOLUTION INFILTRATION; PERINEURAL at 07:34

## 2018-04-20 RX ADMIN — MIDAZOLAM 2 MG: 1 INJECTION INTRAMUSCULAR; INTRAVENOUS at 07:23

## 2018-04-20 RX ADMIN — ONDANSETRON 4 MG: 2 INJECTION INTRAMUSCULAR; INTRAVENOUS at 08:00

## 2018-04-20 RX ADMIN — FENTANYL CITRATE 25 MCG: 50 INJECTION, SOLUTION INTRAMUSCULAR; INTRAVENOUS at 08:00

## 2018-04-20 NOTE — IP AVS SNAPSHOT
MRN:[id number]                      After Visit Summary   4/20/2018    Jennie Davison    MRN: [id number]           Thank you!     Thank you for choosing [facility] for your care. Our goal is always to provide you with excellent care. Hearing back from our patients is one way we can continue to improve our services. Please take a few minutes to complete the written survey that you may receive in the mail after you visit. If you would like to speak to someone directly about your visit please contact Patient Relations at [phone]. Thank you!          Patient Information     Date Of Birth          1965        About your hospital stay     You were admitted on:  April 20, 2018 You last received care in the:  [facility] PreOP/PostOP    You were discharged on:  April 20, 2018       Who to Call     For medical emergencies, please call 911.  For non-urgent questions about your medical care, please call your primary care provider or clinic, [phone]  For questions related to your surgery, please call your surgery clinic        Attending Provider     Provider Specialty    Keyon Lyons MD Urology       Primary Care Provider Office Phone # Fax #    Kenneth G Pallas, [phone]      After Care Instructions     Diet Instructions       Resume pre procedure diet            Encourage fluids       Encourage fluids at home to keep urine clear to light pink                  Further instructions from your care team       CYSTOSCOPY DISCHARGE INSTRUCTIONS  [facility] / UROLOGY  [facility]  [phone]    YOU MAY GO BACK TO YOUR NORMAL DIET AND ACTIVITY, UNLESS YOUR DOCTOR TELLS YOU NOT TO.    FOR THE NEXT TWO DAYS, YOU MAY NOTICE:    SOME BLOOD IN YOUR URINE.  SOME BURNING WHEN YOU URINATE (USE THE TOILET).  AN URGE TO URINATE MORE OFTEN.  BLADDER SPASMS.    THESE ARE NORMAL AFTER THE PROCEDURE.  THEY SHOULD GO AWAY AFTER A DAY OR  TWO.  TO RELIEVE THESE PROBLEMS:     DRINK 6 TO 8 LARGE GLASSES OF WATER EACH DAY (INCLUDES DRINKS AT MEALS).  THIS WILL HELP CLEAR THE URINE.    TAKE WARM BATHS TO RELIEVE PAIN AND BLADDER SPASMS.  DO NOT ADD ANYTHING TO THE BATH WATER.    YOUR DOCTOR MAY PRESCRIBE PAIN MEDICINE.  YOU MAY ALSO TAKE TYLENOL (ACETAMINOPHEN) FOR PAIN.    CALL YOUR SURGEON IF YOU HAVE:    A FEVER OVER 101 DEGREES.  CHECK YOUR TEMPERATURE UNDER YOUR TONGUE.    CHILLS.    FAILURE TO URINATE (NO URINE COMES OUT WHEN YOU TRY TO USE THE TOILET).  TRY SOAKING IN A BATHTUB FULL OF WARM WATER.  IF STILL NO URINE, CALL YOUR DOCTOR.    A LOT OF BLOOD IN THE URINE, OR BLOOD CLOTS LARGER THAN A NICKEL.      PAIN IN THE BACK OR BELLY AREA (ABDOMEN).    PAIN OR SPASMS THAT ARE NOT RELIEVED BY WARM TUB BATHS AND PAIN MEDICINE.      SEVERE PAIN, BURNING OR OTHER PROBLEMS WHILE PASSING URINE.    PAIN THAT GETS WORSE AFTER TWO DAYS.        GENERAL ANESTHESIA OR SEDATION ADULT DISCHARGE INSTRUCTIONS   SPECIAL PRECAUTIONS FOR 24 HOURS AFTER SURGERY    IT IS NOT UNUSUAL TO FEEL LIGHT-HEADED OR FAINT, UP TO 24 HOURS AFTER SURGERY OR WHILE TAKING PAIN MEDICATION.  IF YOU HAVE THESE SYMPTOMS; SIT FOR A FEW MINUTES BEFORE STANDING AND HAVE SOMEONE ASSIST YOU WHEN YOU GET UP TO WALK OR USE THE BATHROOM.    YOU SHOULD REST AND RELAX FOR THE NEXT 24 HOURS AND YOU MUST MAKE ARRANGEMENTS TO HAVE SOMEONE STAY WITH YOU FOR AT LEAST 24 HOURS AFTER YOUR DISCHARGE.  AVOID HAZARDOUS AND STRENUOUS ACTIVITIES.  DO NOT MAKE IMPORTANT DECISIONS FOR 24 HOURS.    DO NOT DRIVE ANY VEHICLE OR OPERATE MECHANICAL EQUIPMENT FOR 24 HOURS FOLLOWING THE END OF YOUR SURGERY.  EVEN THOUGH YOU MAY FEEL NORMAL, YOUR REACTIONS MAY BE AFFECTED BY THE MEDICATION YOU HAVE RECEIVED.    DO NOT DRINK ALCOHOLIC BEVERAGES FOR 24 HOURS FOLLOWING YOUR SURGERY.    DRINK CLEAR LIQUIDS (APPLE JUICE, GINGER ALE, 7-UP, BROTH, ETC.).  PROGRESS TO YOUR REGULAR DIET AS YOU FEEL ABLE.    YOU MAY HAVE A DRY  "MOUTH, A SORE THROAT, MUSCLES ACHES OR TROUBLE SLEEPING.  THESE SHOULD GO AWAY AFTER 24 HOURS.    CALL YOUR DOCTOR FOR ANY OF THE FOLLOWING:  SIGNS OF INFECTION (FEVER, GROWING TENDERNESS AT THE SURGERY SITE, A LARGE AMOUNT OF DRAINAGE OR BLEEDING, SEVERE PAIN, FOUL-SMELLING DRAINAGE, REDNESS OR SWELLING.  IT HAS BEEN OVER 8 TO 10 HOURS SINCE SURGERY AND YOU ARE STILL NOT ABLE TO URINATE (PASS WATER).     You received Toradol, an IV form of ibuprofen (Motrin) at 8:00AM.  Do not take any ibuprofen products until 2:00PM.      Pending Results     Date and Time Order Name Status Description    4/20/2018 0846 Surgical pathology exam In process     4/20/2018 0802 Stone analysis In process             Admission Information     Date & Time Provider Department Dept. Phone    4/20/2018 Keyon Lyons MD [facility] PreOP/PostOP [phone]      Your Vitals Were     Blood Pressure Pulse Temperature Respirations Height Weight    114/83 (BP Location: Right arm) 95 97.7  F (36.5  C) (Temporal) 18 1.727 m (5' 8\") 80.7 kg (178 lb)    Last Period Pulse Oximetry BMI (Body Mass Index)             03/01/2018 100% 27.06 kg/m2         [facility] Information     [facility] gives you secure access to your electronic health record. If you see a primary care provider, you can also send messages to your care team and make appointments. If you have questions, please call your primary care clinic.  If you do not have a primary care provider, please call [phone] and they will assist you.        Care EveryWhere ID     This is your Care EveryWhere ID. This could be used by other organizations to access your [facility] medical records  [id number]        Equal Access to Services     NARAYAN MALDONADO : Pao Fallon, cristofer box, trina cheatham. So [facility] [phone].    ATENCIÓN: Si habla español, tiene a oquendo disposición servicios gratuitos de asistencia lingüística. " Karla fernandez [phone].    We comply with applicable federal civil rights laws and [place] laws. We do not discriminate on the basis of race, color, national origin, age, disability, sex, sexual orientation, or gender identity.               Review of your medicines      CONTINUE these medicines which have NOT CHANGED        Dose / Directions    IBUPROFEN PO        Dose:  200-400 mg   Take 200-400 mg by mouth every 6 hours as needed for moderate pain   Refills:  0       LIOTHYRONINE SODIUM PO        Dose:  10 mcg   Take 10 mcg by mouth daily   Refills:  0       SYNTHROID PO        Dose:  100 mcg   Take 100 mcg by mouth daily   Refills:  0         STOP taking     FLOMAX 0.4 MG capsule   Generic drug:  tamsulosin           senna 8.6 MG tablet   Commonly known as:  SENOKOT           tolterodine 4 MG 24 hr capsule   Commonly known as:  DETROL LA                    Protect others around you: Learn how to safely use, store and throw away your medicines at www.disposemymeds.org.             Medication List: This is a list of all your medications and when to take them. Check marks below indicate your daily home schedule. Keep this list as a reference.      Medications           Morning Afternoon Evening Bedtime As Needed    IBUPROFEN PO   Take 200-400 mg by mouth every 6 hours as needed for moderate pain                                LIOTHYRONINE SODIUM PO   Take 10 mcg by mouth daily                                SYNTHROID PO   Take 100 mcg by mouth daily

## 2018-04-20 NOTE — ANESTHESIA POSTPROCEDURE EVALUATION
Patient: Jennie Davison    Procedure(s):  Cystoscopy Left stent removal left ureteroscopy with holmium laser lithotripsy  stone basketing - Wound Class: II-Clean Contaminated    Diagnosis:left ureteral stone  Diagnosis Additional Information: left ureteral stone    Anesthesia Type:  General, LMA    Note:  Anesthesia Post Evaluation    Patient location during evaluation: PACU  Patient participation: Able to fully participate in evaluation  Level of consciousness: awake  Pain management: adequate  Airway patency: patent  Cardiovascular status: acceptable  Respiratory status: acceptable  Hydration status: acceptable  PONV: controlled     Anesthetic complications: None          Last vitals:  Vitals:    04/20/18 0900 04/20/18 0920 04/20/18 0935   BP: 119/77 114/83 103/76   Pulse:      Resp: 19 18 14   Temp:  97.7  F (36.5  C) 97.8  F (36.6  C)   SpO2: 100% 100%          Electronically Signed By: Braulio Colbert MD  April 20, 2018  4:00 PM

## 2018-04-20 NOTE — OP NOTE
Procedure Date: 04/20/2018      DATE OF PROCEDURE:  04/20/2018      SURGEON:  Keyon Lyons MD      PREOPERATIVE DIAGNOSIS:  Left renal stones and left ureteral stent.        POSTOPERATIVE DIAGNOSIS:  Left renal stones and left ureteral stent.        PROCEDURE PERFORMED:  Cystoscopy, left ureteral stent removal, left retrograde pyelogram, interpretation of fluoroscopic images, left ureteroscopy with holmium laser lithotripsy and stone basketing.      ANESTHESIA:  General.      COMPLICATIONS:  None.      INDICATIONS FOR PROCEDURE:  Jennie Davison is a 52-year-old woman who presented with a distal left ureteral stone last week and had a stent placed.  She now presents for stent and stone removal.      DETAILS OF THE PROCEDURE:  The risks and benefits of the procedure are explained in detail to the patient and informed consent was obtained.  The patient was brought to the operating room and placed supine on the operating table where she underwent general endotracheal anesthetic.  She was then moved down to the dorsal lithotomy position where she was prepped and draped in the standard sterile fashion.      The procedure began by introducing the 22-Beninese cystoscope through the urethra and into the bladder for cystoscopy.  The stent was identified and grasped and pulled to the level of the urethral meatus.  Under fluoroscopic guidance, I cannulated the stent with a Glidewire and then remove the stent.  I clamped the Glidewire to the drape and alongside the Glidewire, I passed the rigid ureteroscope through the urethra into the bladder and up the left ureter.  There were actually 3 stones adjacent to one another in the patient's distal left ureter.  Two of the stones were able to be basketed out without difficulty.  The third stone required fragmentation with a 365 micron holmium laser fiber and then I was able to basket out all of the fragments intact.  I then performed ureteroscopy all up to the left kidney and no  stones remained.  I removed the ureteroscope.  I performed retrograde pyelogram on the left side and there were found to be no other filling defects present.  I removed the Glidewire and did not replace the stent.  I reinserted the cystoscope in order to drain the bladder and to flush out the stone fragments and the procedure was concluded.      The patient tolerated the procedure without complications.  She went to the post-anesthetic care unit in good condition.  She will go home from there.         ANTONINO WATTS MD             D: 2018   T: 2018   MT: JANAY      Name:     WIL ALMENDAREZ   MRN:      [id number]        Account:        [id number]   :      1965           Procedure Date: 2018      Document: [id number]

## 2018-04-20 NOTE — ANESTHESIA CARE TRANSFER NOTE
Patient: Jennie Davison    Procedure(s):  Cystoscopy Left stent removal left ureteroscopy with holmium laser lithotripsy  stone basketing - Wound Class: II-Clean Contaminated    Diagnosis: left ureteral stone  Diagnosis Additional Information: No value filed.    Anesthesia Type:   General, LMA     Note:  Airway :Face Mask  Patient transferred to:PACU  Comments: Pt spon resp, follows commands, LMA removed without complications. Pt tx to PACU, VSS, pt comfortable. Report given to  PACU RN.Handoff Report: Identifed the Patient, Identified the Reponsible Provider, Reviewed the pertinent medical history, Discussed the surgical course, Reviewed Intra-OP anesthesia mangement and issues during anesthesia, Set expectations for post-procedure period and Allowed opportunity for questions and acknowledgement of understanding      Vitals: (Last set prior to Anesthesia Care Transfer)    CRNA VITALS  4/20/2018 0743 - 4/20/2018 0819      4/20/2018             Pulse: 73    SpO2: 100 %    Resp Rate (observed): 11                Electronically Signed By: MIRLANDE Abbott CRNA  April 20, 2018  8:19 AM

## 2018-04-20 NOTE — DISCHARGE INSTRUCTIONS
CYSTOSCOPY DISCHARGE INSTRUCTIONS  [facility] / UROLOGY  [facility]  [phone]    YOU MAY GO BACK TO YOUR NORMAL DIET AND ACTIVITY, UNLESS YOUR DOCTOR TELLS YOU NOT TO.    FOR THE NEXT TWO DAYS, YOU MAY NOTICE:    SOME BLOOD IN YOUR URINE.  SOME BURNING WHEN YOU URINATE (USE THE TOILET).  AN URGE TO URINATE MORE OFTEN.  BLADDER SPASMS.    THESE ARE NORMAL AFTER THE PROCEDURE.  THEY SHOULD GO AWAY AFTER A DAY OR TWO.  TO RELIEVE THESE PROBLEMS:     DRINK 6 TO 8 LARGE GLASSES OF WATER EACH DAY (INCLUDES DRINKS AT MEALS).  THIS WILL HELP CLEAR THE URINE.    TAKE WARM BATHS TO RELIEVE PAIN AND BLADDER SPASMS.  DO NOT ADD ANYTHING TO THE BATH WATER.    YOUR DOCTOR MAY PRESCRIBE PAIN MEDICINE.  YOU MAY ALSO TAKE TYLENOL (ACETAMINOPHEN) FOR PAIN.    CALL YOUR SURGEON IF YOU HAVE:    A FEVER OVER 101 DEGREES.  CHECK YOUR TEMPERATURE UNDER YOUR TONGUE.    CHILLS.    FAILURE TO URINATE (NO URINE COMES OUT WHEN YOU TRY TO USE THE TOILET).  TRY SOAKING IN A BATHTUB FULL OF WARM WATER.  IF STILL NO URINE, CALL YOUR DOCTOR.    A LOT OF BLOOD IN THE URINE, OR BLOOD CLOTS LARGER THAN A NICKEL.      PAIN IN THE BACK OR BELLY AREA (ABDOMEN).    PAIN OR SPASMS THAT ARE NOT RELIEVED BY WARM TUB BATHS AND PAIN MEDICINE.      SEVERE PAIN, BURNING OR OTHER PROBLEMS WHILE PASSING URINE.    PAIN THAT GETS WORSE AFTER TWO DAYS.        GENERAL ANESTHESIA OR SEDATION ADULT DISCHARGE INSTRUCTIONS   SPECIAL PRECAUTIONS FOR 24 HOURS AFTER SURGERY    IT IS NOT UNUSUAL TO FEEL LIGHT-HEADED OR FAINT, UP TO 24 HOURS AFTER SURGERY OR WHILE TAKING PAIN MEDICATION.  IF YOU HAVE THESE SYMPTOMS; SIT FOR A FEW MINUTES BEFORE STANDING AND HAVE SOMEONE ASSIST YOU WHEN YOU GET UP TO WALK OR USE THE BATHROOM.    YOU SHOULD REST AND RELAX FOR THE NEXT 24 HOURS AND YOU MUST MAKE ARRANGEMENTS TO HAVE SOMEONE STAY WITH YOU FOR AT LEAST 24 HOURS AFTER YOUR DISCHARGE.  AVOID HAZARDOUS AND STRENUOUS ACTIVITIES.  DO NOT MAKE IMPORTANT  DECISIONS FOR 24 HOURS.    DO NOT DRIVE ANY VEHICLE OR OPERATE MECHANICAL EQUIPMENT FOR 24 HOURS FOLLOWING THE END OF YOUR SURGERY.  EVEN THOUGH YOU MAY FEEL NORMAL, YOUR REACTIONS MAY BE AFFECTED BY THE MEDICATION YOU HAVE RECEIVED.    DO NOT DRINK ALCOHOLIC BEVERAGES FOR 24 HOURS FOLLOWING YOUR SURGERY.    DRINK CLEAR LIQUIDS (APPLE JUICE, GINGER ALE, 7-UP, BROTH, ETC.).  PROGRESS TO YOUR REGULAR DIET AS YOU FEEL ABLE.    YOU MAY HAVE A DRY MOUTH, A SORE THROAT, MUSCLES ACHES OR TROUBLE SLEEPING.  THESE SHOULD GO AWAY AFTER 24 HOURS.    CALL YOUR DOCTOR FOR ANY OF THE FOLLOWING:  SIGNS OF INFECTION (FEVER, GROWING TENDERNESS AT THE SURGERY SITE, A LARGE AMOUNT OF DRAINAGE OR BLEEDING, SEVERE PAIN, FOUL-SMELLING DRAINAGE, REDNESS OR SWELLING.  IT HAS BEEN OVER 8 TO 10 HOURS SINCE SURGERY AND YOU ARE STILL NOT ABLE TO URINATE (PASS WATER).     You received Toradol, an IV form of ibuprofen (Motrin) at 8:00AM.  Do not take any ibuprofen products until 2:00PM.

## 2018-04-20 NOTE — ANESTHESIA PREPROCEDURE EVALUATION
Anesthesia Evaluation     . Pt has had prior anesthetic. Type: General    No history of anesthetic complications          ROS/MED HX    ENT/Pulmonary:  - neg pulmonary ROS     Neurologic:  - neg neurologic ROS     Cardiovascular:  - neg cardiovascular ROS       METS/Exercise Tolerance:     Hematologic: Comments: Lab Test        04/14/18     04/13/18     07/06/10                       0631          1025          0855          WBC          8.5          7.3           --           HGB          12.1         13.9         14.1          MCV          90           88            --           PLT          220          248           --            Lab Test        04/14/18 04/13/18                       0631          1025          NA           138          139           POTASSIUM    3.8          3.6           CHLORIDE     105          104           CO2          28           28            BUN          12           14            CR           0.99         0.85          ANIONGAP     5            7             ROGER          8.1*         8.9           GLC          134*         101*                  Musculoskeletal:  - neg musculoskeletal ROS       GI/Hepatic:  - neg GI/hepatic ROS       Renal/Genitourinary:     (+) chronic renal disease, Nephrolithiasis ,       Endo:     (+) thyroid problem hypothyroidism, .      Psychiatric:  - neg psychiatric ROS       Infectious Disease:  - neg infectious disease ROS       Malignancy:         Other:    - neg other ROS                 Physical Exam  Normal systems: cardiovascular, pulmonary and dental    Airway   Mallampati: II  TM distance: >3 FB  Neck ROM: full    Dental     Cardiovascular       Pulmonary                     Anesthesia Plan      History & Physical Review  History and physical reviewed and following examination; no interval change.    ASA Status:  2 .    NPO Status:  > 8 hours    Plan for General and LMA with Intravenous induction. Maintenance will be Balanced.    PONV  prophylaxis:  Ondansetron (or other 5HT-3) and Dexamethasone or Solumedrol       Postoperative Care  Postoperative pain management:  IV analgesics and Oral pain medications.      Consents  Anesthetic plan, risks, benefits and alternatives discussed with:  Patient..                          .

## 2018-04-20 NOTE — IP AVS SNAPSHOT
[facility] PreOP/PostOP    [address]    [facility] [address]    Phone:  [phone]    Fax:  [phone]                                       After Visit Summary   4/20/2018    Jennie Davison    MRN: [id number]           After Visit Summary Signature Page     I have received my discharge instructions, and my questions have been answered. I have discussed any challenges I see with this plan with the nurse or doctor.    ..........................................................................................................................................  Patient/Patient Representative Signature      ..........................................................................................................................................  Patient Representative Print Name and Relationship to Patient    ..................................................               ................................................  Date                                            Time    ..........................................................................................................................................  Reviewed by Signature/Title    ...................................................              ..............................................  Date                                                            Time

## 2018-04-21 ENCOUNTER — HEALTH MAINTENANCE LETTER (OUTPATIENT)
Age: 53
End: 2018-04-21

## 2018-04-25 LAB
APPEARANCE STONE: NORMAL
COMPN STONE: NORMAL
NUMBER STONE: NORMAL
SIZE STONE: NORMAL MM
WT STONE: 94 MG

## 2018-08-14 ENCOUNTER — PRE VISIT (OUTPATIENT)
Dept: UROLOGY | Facility: CLINIC | Age: 53
End: 2018-08-14

## 2018-08-14 NOTE — TELEPHONE ENCOUNTER
Patient with history of kidney stones coming in for kidney stone prevention discussion with Dr. Parkinson. Génesis not available. Patient chart reviewed, no need for call, all records available and ready for appointment.

## 2018-08-20 ENCOUNTER — PATIENT OUTREACH (OUTPATIENT)
Dept: CARE COORDINATION | Facility: CLINIC | Age: 53
End: 2018-08-20

## 2018-08-21 ENCOUNTER — OFFICE VISIT (OUTPATIENT)
Dept: NEPHROLOGY | Facility: CLINIC | Age: 53
End: 2018-08-21
Payer: COMMERCIAL

## 2018-08-21 VITALS
SYSTOLIC BLOOD PRESSURE: 109 MMHG | HEART RATE: 85 BPM | HEIGHT: 68 IN | DIASTOLIC BLOOD PRESSURE: 70 MMHG | WEIGHT: 180 LBS | BODY MASS INDEX: 27.28 KG/M2

## 2018-08-21 DIAGNOSIS — N20.0 NEPHROLITHIASIS: ICD-10-CM

## 2018-08-21 DIAGNOSIS — N20.0 NEPHROLITHIASIS: Primary | ICD-10-CM

## 2018-08-21 DIAGNOSIS — N20.0 KIDNEY STONE: ICD-10-CM

## 2018-08-21 LAB
ALBUMIN SERPL-MCNC: 3.4 G/DL (ref 3.4–5)
ALBUMIN UR-MCNC: NEGATIVE MG/DL
ANION GAP SERPL CALCULATED.3IONS-SCNC: 6 MMOL/L (ref 3–14)
APPEARANCE UR: NORMAL
BILIRUB UR QL STRIP: NEGATIVE
BUN SERPL-MCNC: 20 MG/DL (ref 7–30)
CALCIUM SERPL-MCNC: 8.9 MG/DL (ref 8.5–10.1)
CHLORIDE SERPL-SCNC: 104 MMOL/L (ref 94–109)
CO2 SERPL-SCNC: 28 MMOL/L (ref 20–32)
COLOR UR AUTO: YELLOW
CREAT SERPL-MCNC: 0.7 MG/DL (ref 0.52–1.04)
GFR SERPL CREATININE-BSD FRML MDRD: 87 ML/MIN/1.7M2
GLUCOSE SERPL-MCNC: 90 MG/DL (ref 70–99)
GLUCOSE UR STRIP-MCNC: NEGATIVE MG/DL
HGB UR QL STRIP: NEGATIVE
KETONES UR STRIP-MCNC: NEGATIVE MG/DL
LEUKOCYTE ESTERASE UR QL STRIP: NEGATIVE
NITRATE UR QL: NEGATIVE
PH UR STRIP: 6 PH (ref 5–7)
PHOSPHATE SERPL-MCNC: 3.1 MG/DL (ref 2.5–4.5)
POTASSIUM SERPL-SCNC: 3.7 MMOL/L (ref 3.4–5.3)
SODIUM SERPL-SCNC: 138 MMOL/L (ref 133–144)
SOURCE: NORMAL
SP GR UR STRIP: 1.02 (ref 1–1.03)
UROBILINOGEN UR STRIP-MCNC: 0 MG/DL (ref 0–2)

## 2018-08-21 RX ORDER — CEFUROXIME AXETIL 250 MG/1
TABLET ORAL
COMMUNITY
Start: 2018-05-29

## 2018-08-21 ASSESSMENT — PAIN SCALES - GENERAL: PAINLEVEL: NO PAIN (0)

## 2018-08-21 NOTE — LETTER
8/21/2018       RE: Jennie Davison  [address] [facility] [address]     Dear Colleague,    Thank you for referring your patient, Jennie Davison, to the [facility] UROLOGY AND INST FOR PROSTATE AND UROLOGIC CANCERS at [facility]. Please see a copy of my visit note below.    [facility] Nephrology Comprehensive Stone Clinic  08/21/2018     Jennie Davison MRN:[id number] YOB: 1965  Primary care provider: Pallas, Kenneth G  Requesting physician: Dr. Daniel Burt    ASSESSMENT AND RECOMMENDATIONS:   Jennie Davison is a 53 year old female presenting for nephrolithiasis.  Recurrent nephrolithiasis- Likely due to Topamax, low fluid intake and possible contribution of excessive calcium and sodium in the form of milk 16 oz 2-3 times daily.  The patient was consulted to increase her fluid intake to generate a urine volume of 2.5 L per day and to limit salt intake less than 2400 mg sodium per day or 500-700 mg per meal.  She was advised to minimize processed food.  Patient was advised to eat 3 servings daily of food and beverages high calcium. can be either 8 oz milk, 6 oz yogurt or 2 oz low sodium cheese or 8 oz calcium fortified OJ/dairy alternative (for her we prefer that she limit glass of milk to 8 ounces and not drink 16 ounce glass).  Patient is advised to eat protein meats in moderation. She was advised to add add lemon or lime to foods and beverages.  Consider 2-4 oz lemon or lime juice diluted in water.  Patient was advise to avoid taking Topamax in future given it carbonic anhydrase inhibitory activity.   We will repeat renal panel and UA    Ordered 24 hour urine chemistries to further determine risk factors for recurrence.    Folliculitis of the scalp-it is ok to take spironolactone    RTC 12 weeks with litholink      Patient was staffed with Dr.Elfering Shayy Shelley MD  Nephrology fellow     Attestation:  This patient has been seen and  evaluated by me, Jasmin Pariknson MD on 8/21/18 .  Discussed with the fellow or resident and agree with the findings and plan in this note.  I have reviewed  Medications, Vital Signs, Labs and Imaging as well as provider notes.    aJsmin Parkinson MD  [facility]  Department of Medicine  Division of Renal Disease and Hypertension  [phone]       REASON FOR CONSULT: nephrolithiasis    HISTORY OF PRESENT ILLNESS:  Jennie Davison is a 53 year old female with PMHx significant for recurrent nephrolithiasis.  Patient states that she has had her first kidney stone in 6/2008 that she was able to successfully pass on her own. Patient states that before that she was taking Topamax for many months for migraine. She has not been taking Topamax  Since 06/2008 until 2 weeks before her second kidney stone in April of 2018.  Initially the patient presented with acute renal colic  to [facility] in [place] and was diagnosed with a 4 mm distal left ureteral stone.  Her urinalysis and labs otherwise appeared unremarkable at that time.  She was admitted to the hospital overnight and her pain resolved completely.  She was ultimately discharged home to try to pass her stone.  She has done poorly at home and has had more or less continued left-sided flank pain since being discharged from [facility].  She came back to the Emergency Department at [facility] on 4/13 . A KUB  did not show the stone, but a renal ultrasound 4/13 showed persistent moderate left hydronephrosis.  Her urinalysis showed pH of 5 and hematuria with 14 RBC/hpf.  Her BMP showed serum creatinine of 0.85 mg/dl. Patient was resuscitated with IVF, and anti-emetics.  Patient was evaluated by Urology service and underwent stent placement on 4/14/2018. Subsequently 4/20/18 she underwent cystoscopy, left ureteral stent removal, left retrograde pyelogram, interpretation of fluoroscopic images, left ureteroscopy with holmium laser  lithotripsy and stone basketing.   tone analysis showed calculi composed of 40% of calcium oxalate and 60% of calcium phosphate.  Patient's other PMHx significant for thyroid cancer s/p thyroidectomy, acquired hypothyroidism and folliculitis of the scalp. Patient takes Ceftin 250 mg daily for folliculitis. Patient is asking if it would be safe for her to start taking spironolactone to treat her folliculitis as it was recommended by her dermatologist.  Patient reports history of recurrent urinary tract infection. Patient states that she had 2 episodes of UTI in 2017 and one episode in 01/2018.   Patient states that she is feeling well. Patient denies any complaints.  Patient denies: fever, chills, weight loss, dizziness, adenopathy, sore throat, rhinorrhea, cough, shortness of breath , chest pain, palpitations, orthopnea, nausea, vomiting, abdominal pain, changes in bowel habits, dysuria, urinary frequency, urgency, hematuria, rash         Last imaging: US RENAL COMPLETE 4/13/2018   Last Surgery: 04/20/18 (I reviewed op report)  Stone Free (no)There appear to be multiple right stones, with the largest measuring 0.8 cm. There are multiple left renal  cysts, with the largest measuring 1.7 cm. There is a 2.5 cm left renal cyst.       Jennie Davison's diet consists of 3 meals per day.    1st meal oatmeal with almonds, blueberries and chi seeds  2nd meal salad with grilled chicken or yogurt with fruit or sneaker bar  3rd meal spaghetti with meat souse or home made tacos or grilled chicken or salmon  with vegetables  Snacks  cookies or chocolate bar    Patient states she eats 2 cups of fruit per day    Fluid intake includes 28 oz of water, 2-3 glasses (16oz each) of cow milk with meals per day, one cup of coffee at breakfast    Family history is negative for kidney stones    PAST MEDICAL HISTORY:  Reviewed  Past Medical History:   Diagnosis Date     Acquired hypothyroidism      Dissecting folliculitis of scalp       Migraines      Recurrent nephrolithiasis 2005    first episode     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 Cystourethroscopy with left retrograde pyelography  2. Placement of left ureteral stent.  3. Intraoperative interpretation of fluoroscopic imaging;  Surgeon: Daniel Burt MD;  Location: [facility] OR     LASER HOLMIUM LITHOTRIPSY URETER(S), INSERT STENT, COMBINED N/A 4/20/2018    Procedure: COMBINED CYSTOSCOPY, URETEROSCOPY, LASER HOLMIUM LITHOTRIPSY URETER(S), INSERT STENT;  Cystoscopy, left ureteral stent removal, left retrograde pyelogram, interpretation of fluoroscopic images, left ureteroscopy with holmium laser lithotripsy and stone basketing;  Surgeon: Keyon Lyons MD;  Location: [facility] OR     SLING BLADDER SUSPENSION WITH FASCIA MELO  1998     THYROIDECTOMY Bilateral 2009        MEDICATIONS:  Current Outpatient Prescriptions   Medication Sig Dispense Refill     cefuroxime (CEFTIN) 250 MG tablet        Levothyroxine Sodium (SYNTHROID PO) Take 100 mcg by mouth daily       LIOTHYRONINE SODIUM PO Take 10 mcg by mouth daily          ALLERGIES:    Allergies   Allergen Reactions     Nuts Anaphylaxis and Swelling     Wallnuts       REVIEW OF SYSTEMS:  A 10 point review of systems was negative except as noted above.    SOCIAL HISTORY:   Reviewed  Employed as strategy adviser for financial planning  Social History     Social History     Marital status:      Spouse name: N/A     Number of children: 4     Years of education: N/A     Occupational History     strategy adviser for financial planning  [employer]     Social History Main Topics     Smoking status: Never Smoker      "Smokeless tobacco: Never Used     Alcohol use Yes      Comment: 1 drink yearly     Drug use: No     Sexual activity: Not on file     Other Topics Concern     Not on file     Social History Narrative    Lives in [place], [place]       FAMILY MEDICAL HISTORY:   Family History   Problem Relation Age of Onset     Nephrolithiasis No family hx of      KIDNEY DISEASE No family hx of        PHYSICAL EXAM:   /70  Pulse 85  Ht 1.727 m (5' 8\")  Wt 81.6 kg (180 lb)  BMI 27.37 kg/m2     GENERAL APPEARANCE: alert and no distress  EYES: no scleral icterus, gaze conjugate  HENT: mouth without ulcers or lesions  Endocrine: no moon facies, no goiter  RESP: normal work of breathing, no cyanosis or clubbing   CV: no edema, warm and well perfused  : no Alegre, no CVA tenderness  SKIN: no rash, warm, dry  NEURO: face symmetric, mentation intact and speech normal  Psych: well groomed, affect full, pleasant, normal mental status  Musculoskeletal: grossly normal ROM of arms and legs without obvious joint abnormalities    LABS:   I reviewed:  Electrolytes/Renal -   Recent Labs   Lab Test  04/14/18   0631  04/13/18   1025   NA  138  139   POTASSIUM  3.8  3.6   CHLORIDE  105  104   CO2  28  28   BUN  12  14   CR  0.99  0.85   GLC  134*  101*   ROGER  8.1*  8.9       CBC -   Recent Labs   Lab Test  04/14/18   0631  04/13/18   1025  07/06/10   0855   WBC  8.5  7.3   --    HGB  12.1  13.9  14.1   PLT  220  248   --        LFTs - No lab results found.    Coags - No lab results found.    Iron Panel - No lab results found.    Endocrine - No lab results found.    IMAGING:  All imaging studies reviewed by me.     Shayy Shelley MD           Again, thank you for allowing me to participate in the care of your patient.      Sincerely,    Jasmin Parkinson MD      "

## 2018-08-21 NOTE — NURSING NOTE
Chief Complaint   Patient presents with     Consult     kidney stones coming in for kidney stone prevention discussion       Pippa Camacho MA

## 2018-08-21 NOTE — PATIENT INSTRUCTIONS
Dear Jennie Davison      Your were seen in the [facility] Comprehensive Kidney Stone Clinic.  Basic advice to avoid kidney stones  - Drink 100 ounces of fluid daily (urine volume should be 80 ounces per day)  - low sodium diet (less than 2400 mg sodium per day- 500-700 mg per meal)  - minimize process foods  - three servings daily of food/beverage high in calcium.  Please have one high calcium food three times a day with meals - can be either 8 oz milk, 6 oz yogurt or 2 oz low sodium cheese or 8 oz calcium fortified OJ/dairy alternative)   ---- Total should be at least 1000 mg calcium a day from food  - Protein (meat) in moderation  - add lemon or lime to foods and beverages.  Consider 2-4 oz lemon or lime juice diluted in water.  I advise against lemonade since it is high in sugar and low in actual lemon juice.     [email or web address]    Today we discussed     At your next visit I anticipate we will discuss your 24 hour urine collection        Please get the following tests done: 24 hour urine collection      Please set up appointment with: stone clinic after you complete 24 hour urine collection    Avoid Topamax    Ok to take Spironolactone for your folliculitis      It was a pleasure meeting with you today. Thank you for allowing me and my team the privilege of caring for you today. YOU are the reason we are here, and I truly hope we provided you with the excellent service you deserve. Please let us know if there is anything else we can do for you so that we can be sure you are leaving completely satisfied with your care experience.    Take care!  Jasmin Parkinson MD  Department of Medicine  Division of Renal Diseases and Hypertension  [facility]    Email: [email or web address]  You may reach a nurse by calling the urology clinic at [phone]

## 2018-08-21 NOTE — MR AVS SNAPSHOT
After Visit Summary   8/21/2018    Jennie Davison    MRN: [id number]           Patient Information     Date Of Birth          1965        Visit Information        Provider Department      8/21/2018 2:00 PM Jasmin Parkinson MD [facility] Urology and [facility] for Prostate and Urologic Cancers        Today's Diagnoses     Nephrolithiasis    -  1      Care Instructions    Dear Jennie Davison      Your were seen in the [facility] Comprehensive Kidney Stone Clinic.  Basic advice to avoid kidney stones  - Drink 100 ounces of fluid daily (urine volume should be 80 ounces per day)  - low sodium diet (less than 2400 mg sodium per day- 500-700 mg per meal)  - minimize process foods  - three servings daily of food/beverage high in calcium.  Please have one high calcium food three times a day with meals - can be either 8 oz milk, 6 oz yogurt or 2 oz low sodium cheese or 8 oz calcium fortified OJ/dairy alternative)   ---- Total should be at least 1000 mg calcium a day from food  - Protein (meat) in moderation  - add lemon or lime to foods and beverages.  Consider 2-4 oz lemon or lime juice diluted in water.  I advise against lemonade since it is high in sugar and low in actual lemon juice.     [email or web address]    Today we discussed     At your next visit I anticipate we will discuss your 24 hour urine collection        Please get the following tests done: 24 hour urine collection      Please set up appointment with: stone clinic after you complete 24 hour urine collection    Avoid Topamax    Ok to take Spironolactone for your folliculitis      It was a pleasure meeting with you today. Thank you for allowing me and my team the privilege of caring for you today. YOU are the reason we are here, and I truly hope we provided you with the excellent service you deserve. Please let us know if there is anything else we can do for you so that we can be sure you are leaving completely satisfied  "with your care experience.    Take care!  Jasmin Parkinson MD  Department of Medicine  Division of Renal Diseases and Hypertension  [facility]    Email: [email or web address]  You may reach a nurse by calling the urology clinic at [phone]                 Follow-ups after your visit        Future tests that were ordered for you today     Open Future Orders        Priority Expected Expires Ordered    Renal panel Routine  9/20/2018 8/21/2018    US Head Neck Soft Tissue Routine  8/20/2019 8/20/2018            Who to contact     Please call your clinic at [phone] to:    Ask questions about your health    Make or cancel appointments    Discuss your medicines    Learn about your test results    Speak to your doctor            Additional Information About Your Visit        [email or web address] Information     [employer] gives you secure access to your electronic health record. If you see a primary care provider, you can also send messages to your care team and make appointments. If you have questions, please call your primary care clinic.  If you do not have a primary care provider, please call [phone] and they will assist you.      [employer] is an electronic gateway that provides easy, online access to your medical records. With [employer], you can request a clinic appointment, read your test results, renew a prescription or communicate with your care team.     To access your existing account, please contact your [facility] or call [phone] for assistance.        Care EveryWhere ID     This is your Care EveryWhere ID. This could be used by other organizations to access your [facility] medical records  [id number]        Your Vitals Were     Pulse Height BMI (Body Mass Index)             85 1.727 m (5' 8\") 27.37 kg/m2          Blood Pressure from Last 3 Encounters:   08/21/18 109/70   04/20/18 103/76   04/14/18 145/85    Weight from Last 3 Encounters:   08/21/18 81.6 kg (180 lb)   04/20/18 " 80.7 kg (178 lb)   04/13/18 81.6 kg (180 lb)                 Today's Medication Changes          These changes are accurate as of 8/21/18  3:10 PM.  If you have any questions, ask your nurse or doctor.               Stop taking these medicines if you haven't already. Please contact your care team if you have questions.     IBUPROFEN PO   Stopped by:  Jasmin Parkinson MD                    Primary Care Provider Office Phone # Fax #    Edmund G Pallas, [phone]       [facility] [address] [facility] [address]        Equal Access to Services     [facility]: Hadii aad fei hadasho Soyas, waaxda luqadaha, qaybta kaalmada adepratibhayamichelle, trina chau . So [facility] [phone].    ATENCIÓN: Si habla español, tiene a oquendo disposición servicios gratuitos de asistencia lingüística. Llame al [phone].    We comply with applicable federal civil rights laws and [place] laws. We do not discriminate on the basis of race, color, national origin, age, disability, sex, sexual orientation, or gender identity.            Thank you!     Thank you for choosing [facility] UROLOGY AND [facility] FOR PROSTATE AND UROLOGIC CANCERS  for your care. Our goal is always to provide you with excellent care. Hearing back from our patients is one way we can continue to improve our services. Please take a few minutes to complete the written survey that you may receive in the mail after your visit with us. Thank you!             Your Updated Medication List - Protect others around you: Learn how to safely use, store and throw away your medicines at www.disposemymeds.org.          This list is accurate as of 8/21/18  3:10 PM.  Always use your most recent med list.                   Brand Name Dispense Instructions for use Diagnosis    cefuroxime 250 MG tablet    CEFTIN          LIOTHYRONINE SODIUM PO      Take 10 mcg by mouth daily        SYNTHROID PO      Take 100 mcg by mouth  daily

## 2018-08-21 NOTE — PROGRESS NOTES
[facility]  08/21/2018     Jennie Davison MRN:[id number] YOB: 1965  Primary care provider: Pallas, Kenneth G  Requesting physician: Dr. Daniel Burt    ASSESSMENT AND RECOMMENDATIONS:   Jennie Davison is a 53 year old female presenting for nephrolithiasis.  Recurrent nephrolithiasis- Likely due to Topamax, low fluid intake and possible contribution of excessive calcium and sodium in the form of milk 16 oz 2-3 times daily.  The patient was consulted to increase her fluid intake to generate a urine volume of 2.5 L per day and to limit salt intake less than 2400 mg sodium per day or 500-700 mg per meal.  She was advised to minimize processed food.  Patient was advised to eat 3 servings daily of food and beverages high calcium. can be either 8 oz milk, 6 oz yogurt or 2 oz low sodium cheese or 8 oz calcium fortified OJ/dairy alternative (for her we prefer that she limit glass of milk to 8 ounces and not drink 16 ounce glass).  Patient is advised to eat protein meats in moderation. She was advised to add add lemon or lime to foods and beverages.  Consider 2-4 oz lemon or lime juice diluted in water.  Patient was advise to avoid taking Topamax in future given it carbonic anhydrase inhibitory activity.   We will repeat renal panel and UA    Ordered 24 hour urine chemistries to further determine risk factors for recurrence.    Folliculitis of the scalp-it is ok to take spironolactone    RTC 12 weeks with litholink      Patient was staffed with Dr.Elfering Shayy Shelley MD  Nephrology fellow     Attestation:  This patient has been seen and evaluated by me, Jasmin Parkinson MD on 8/21/18 .  Discussed with the fellow or resident and agree with the findings and plan in this note.  I have reviewed  Medications, Vital Signs, Labs and Imaging as well as provider notes.    Jasmin Parkinson MD  [facility]  Department of  Medicine  Division of Renal Disease and Hypertension  [phone]       REASON FOR CONSULT: nephrolithiasis    HISTORY OF PRESENT ILLNESS:  Jennie Davison is a 53 year old female with PMHx significant for recurrent nephrolithiasis.  Patient states that she has had her first kidney stone in 6/2008 that she was able to successfully pass on her own. Patient states that before that she was taking Topamax for many months for migraine. She has not been taking Topamax  Since 06/2008 until 2 weeks before her second kidney stone in April of 2018.  Initially the patient presented with acute renal colic  to [facility] in [place] and was diagnosed with a 4 mm distal left ureteral stone.  Her urinalysis and labs otherwise appeared unremarkable at that time.  She was admitted to the hospital overnight and her pain resolved completely.  She was ultimately discharged home to try to pass her stone.  She has done poorly at home and has had more or less continued left-sided flank pain since being discharged from [facility].  She came back to the Emergency Department at [facility] on 4/13 . A KUB  did not show the stone, but a renal ultrasound 4/13 showed persistent moderate left hydronephrosis.  Her urinalysis showed pH of 5 and hematuria with 14 RBC/hpf.  Her BMP showed serum creatinine of 0.85 mg/dl. Patient was resuscitated with IVF, and anti-emetics.  Patient was evaluated by Urology service and underwent stent placement on 4/14/2018. Subsequently 4/20/18 she underwent cystoscopy, left ureteral stent removal, left retrograde pyelogram, interpretation of fluoroscopic images, left ureteroscopy with holmium laser lithotripsy and stone basketing.   tone analysis showed calculi composed of 40% of calcium oxalate and 60% of calcium phosphate.  Patient's other PMHx significant for thyroid cancer s/p thyroidectomy, acquired hypothyroidism and folliculitis of the scalp. Patient takes Ceftin 250 mg daily for folliculitis.  Patient is asking if it would be safe for her to start taking spironolactone to treat her folliculitis as it was recommended by her dermatologist.  Patient reports history of recurrent urinary tract infection. Patient states that she had 2 episodes of UTI in 2017 and one episode in 01/2018.   Patient states that she is feeling well. Patient denies any complaints.  Patient denies: fever, chills, weight loss, dizziness, adenopathy, sore throat, rhinorrhea, cough, shortness of breath , chest pain, palpitations, orthopnea, nausea, vomiting, abdominal pain, changes in bowel habits, dysuria, urinary frequency, urgency, hematuria, rash         Last imaging: US RENAL COMPLETE 4/13/2018   Last Surgery: 04/20/18 (I reviewed op report)  Stone Free (no)There appear to be multiple right stones, with the largest measuring 0.8 cm. There are multiple left renal  cysts, with the largest measuring 1.7 cm. There is a 2.5 cm left renal cyst.       Jennie Davison's diet consists of 3 meals per day.    1st meal oatmeal with almonds, blueberries and chi seeds  2nd meal salad with grilled chicken or yogurt with fruit or sneaker bar  3rd meal spaghetti with meat souse or home made tacos or grilled chicken or salmon  with vegetables  Snacks  cookies or chocolate bar    Patient states she eats 2 cups of fruit per day    Fluid intake includes 28 oz of water, 2-3 glasses (16oz each) of cow milk with meals per day, one cup of coffee at breakfast    Family history is negative for kidney stones    PAST MEDICAL HISTORY:  Reviewed  Past Medical History:   Diagnosis Date     Acquired hypothyroidism      Dissecting folliculitis of scalp      Migraines      Recurrent nephrolithiasis 2005    first episode     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 Cystourethroscopy with left retrograde pyelography  2. Placement of left ureteral stent.  3. Intraoperative interpretation of fluoroscopic imaging;  Surgeon: Daniel Burt MD;  Location: [facility] OR     LASER HOLMIUM LITHOTRIPSY URETER(S), INSERT STENT, COMBINED N/A 4/20/2018    Procedure: COMBINED CYSTOSCOPY, URETEROSCOPY, LASER HOLMIUM LITHOTRIPSY URETER(S), INSERT STENT;  Cystoscopy, left ureteral stent removal, left retrograde pyelogram, interpretation of fluoroscopic images, left ureteroscopy with holmium laser lithotripsy and stone basketing;  Surgeon: Keyon Lyons MD;  Location: [facility] OR     SLING BLADDER SUSPENSION WITH FASCIA MELO  1998     THYROIDECTOMY Bilateral 2009        MEDICATIONS:  Current Outpatient Prescriptions   Medication Sig Dispense Refill     cefuroxime (CEFTIN) 250 MG tablet        Levothyroxine Sodium (SYNTHROID PO) Take 100 mcg by mouth daily       LIOTHYRONINE SODIUM PO Take 10 mcg by mouth daily          ALLERGIES:    Allergies   Allergen Reactions     Nuts Anaphylaxis and Swelling     Wallnuts       REVIEW OF SYSTEMS:  A 10 point review of systems was negative except as noted above.    SOCIAL HISTORY:   Reviewed  Employed as strategy adviser for financial planning  Social History     Social History     Marital status:      Spouse name: N/A     Number of children: 4     Years of education: N/A     Occupational History     strategy adviser for financial planning  [employer]     Social History Main Topics     Smoking status: Never Smoker     Smokeless tobacco: Never Used     Alcohol use Yes      Comment: 1 drink yearly     Drug use: No     Sexual activity: Not on file     Other Topics Concern     Not on file     Social History Narrative    Lives in [place], [place]       FAMILY MEDICAL HISTORY:   Family History   Problem Relation Age of Onset      "Nephrolithiasis No family hx of      KIDNEY DISEASE No family hx of        PHYSICAL EXAM:   /70  Pulse 85  Ht 1.727 m (5' 8\")  Wt 81.6 kg (180 lb)  BMI 27.37 kg/m2     GENERAL APPEARANCE: alert and no distress  EYES: no scleral icterus, gaze conjugate  HENT: mouth without ulcers or lesions  Endocrine: no moon facies, no goiter  RESP: normal work of breathing, no cyanosis or clubbing   CV: no edema, warm and well perfused  : no Alegre, no CVA tenderness  SKIN: no rash, warm, dry  NEURO: face symmetric, mentation intact and speech normal  Psych: well groomed, affect full, pleasant, normal mental status  Musculoskeletal: grossly normal ROM of arms and legs without obvious joint abnormalities    LABS:   I reviewed:  Electrolytes/Renal -   Recent Labs   Lab Test  04/14/18   0631  04/13/18   1025   NA  138  139   POTASSIUM  3.8  3.6   CHLORIDE  105  104   CO2  28  28   BUN  12  14   CR  0.99  0.85   GLC  134*  101*   ROEGR  8.1*  8.9       CBC -   Recent Labs   Lab Test  04/14/18   0631  04/13/18   1025  07/06/10   0855   WBC  8.5  7.3   --    HGB  12.1  13.9  14.1   PLT  220  248   --        LFTs - No lab results found.    Coags - No lab results found.    Iron Panel - No lab results found.    Endocrine - No lab results found.    IMAGING:  All imaging studies reviewed by me.     Shayy Shelley MD         "

## 2018-09-24 ENCOUNTER — TRANSFERRED RECORDS (OUTPATIENT)
Dept: HEALTH INFORMATION MANAGEMENT | Facility: CLINIC | Age: 53
End: 2018-09-24

## 2018-11-20 ENCOUNTER — TELEPHONE (OUTPATIENT)
Dept: UROLOGY | Facility: CLINIC | Age: 53
End: 2018-11-20

## 2018-11-20 NOTE — TELEPHONE ENCOUNTER
Litholink form filled out and faxed to [facility].  [facility].

## 2018-11-20 NOTE — TELEPHONE ENCOUNTER
----- Message from Jasmin Parkinson MD sent at 11/16/2018  1:36 PM CST -----  Regarding: judy Ramirez - can you please mail her copy of last litholink report and order litholink for January?      Clinic coord - can you please schedule her follow up with me in stone clinic?

## 2018-12-05 ENCOUNTER — HOSPITAL ENCOUNTER (OUTPATIENT)
Dept: ULTRASOUND IMAGING | Facility: CLINIC | Age: 53
Discharge: HOME | End: 2018-12-05
Attending: INTERNAL MEDICINE | Admitting: INTERNAL MEDICINE
Payer: COMMERCIAL

## 2018-12-05 DIAGNOSIS — Z08 ENCOUNTER FOR FOLLOW-UP SURVEILLANCE OF THYROID CANCER: ICD-10-CM

## 2018-12-05 DIAGNOSIS — Z85.850 ENCOUNTER FOR FOLLOW-UP SURVEILLANCE OF THYROID CANCER: ICD-10-CM

## 2018-12-05 PROCEDURE — 76536 US EXAM OF HEAD AND NECK: CPT

## 2019-06-11 ENCOUNTER — PRE VISIT (OUTPATIENT)
Dept: NEPHROLOGY | Facility: CLINIC | Age: 54
End: 2019-06-11

## 2019-06-11 NOTE — TELEPHONE ENCOUNTER
Patient with history of kidney stones coming in for kidney stone prevention discussion with Dr. Parkinson. Génesis available. Patient chart reviewed, no need for call, all records available and ready for appointment.

## 2019-10-01 ENCOUNTER — HEALTH MAINTENANCE LETTER (OUTPATIENT)
Age: 54
End: 2019-10-01

## 2021-01-15 ENCOUNTER — HEALTH MAINTENANCE LETTER (OUTPATIENT)
Age: 56
End: 2021-01-15

## 2021-07-29 ENCOUNTER — OFFICE VISIT (OUTPATIENT)
Dept: INTERNAL MEDICINE | Facility: CLINIC | Age: 56
End: 2021-07-29
Payer: COMMERCIAL

## 2021-07-29 VITALS
OXYGEN SATURATION: 99 % | WEIGHT: 170 LBS | SYSTOLIC BLOOD PRESSURE: 110 MMHG | HEIGHT: 68 IN | RESPIRATION RATE: 14 BRPM | HEART RATE: 95 BPM | BODY MASS INDEX: 25.76 KG/M2 | DIASTOLIC BLOOD PRESSURE: 66 MMHG | TEMPERATURE: 97.5 F

## 2021-07-29 DIAGNOSIS — R30.0 DYSURIA: Primary | ICD-10-CM

## 2021-07-29 LAB
ALBUMIN UR-MCNC: NEGATIVE MG/DL
APPEARANCE UR: CLEAR
BACTERIA #/AREA URNS HPF: ABNORMAL /HPF
BILIRUB UR QL STRIP: NEGATIVE
CAOX CRY #/AREA URNS HPF: ABNORMAL /HPF
COLOR UR AUTO: YELLOW
GLUCOSE UR STRIP-MCNC: NEGATIVE MG/DL
HGB UR QL STRIP: ABNORMAL
KETONES UR STRIP-MCNC: NEGATIVE MG/DL
LEUKOCYTE ESTERASE UR QL STRIP: ABNORMAL
NITRATE UR QL: POSITIVE
PH UR STRIP: 5.5 [PH] (ref 5–7)
RBC #/AREA URNS AUTO: ABNORMAL /HPF
SP GR UR STRIP: 1.02 (ref 1–1.03)
SQUAMOUS #/AREA URNS AUTO: ABNORMAL /LPF
UROBILINOGEN UR STRIP-ACNC: 0.2 E.U./DL
WBC #/AREA URNS AUTO: >100 /HPF
WBC CLUMPS #/AREA URNS HPF: PRESENT /HPF

## 2021-07-29 PROCEDURE — 99204 OFFICE O/P NEW MOD 45 MIN: CPT | Performed by: NURSE PRACTITIONER

## 2021-07-29 PROCEDURE — 87186 SC STD MICRODIL/AGAR DIL: CPT | Performed by: NURSE PRACTITIONER

## 2021-07-29 PROCEDURE — 81001 URINALYSIS AUTO W/SCOPE: CPT | Performed by: NURSE PRACTITIONER

## 2021-07-29 PROCEDURE — 87086 URINE CULTURE/COLONY COUNT: CPT | Performed by: NURSE PRACTITIONER

## 2021-07-29 RX ORDER — CIPROFLOXACIN 250 MG/1
250 TABLET, FILM COATED ORAL 2 TIMES DAILY
Qty: 6 TABLET | Refills: 0 | Status: SHIPPED | OUTPATIENT
Start: 2021-07-29 | End: 2021-08-01

## 2021-07-29 RX ORDER — SPIRONOLACTONE 100 MG/1
100 TABLET, FILM COATED ORAL DAILY
COMMUNITY

## 2021-07-29 ASSESSMENT — MIFFLIN-ST. JEOR: SCORE: 1409.61

## 2021-07-29 NOTE — PROGRESS NOTES
"    Assessment & Plan     Dysuria    - UA Macro with Reflex to Micro and Culture - lab collect  - Urine Microscopic Exam  - Urine Culture  - ciprofloxacin (CIPRO) 250 MG tablet; Take 1 tablet (250 mg) by mouth 2 times daily for 3 days    Push fluids, RTC prn         BMI:   Estimated body mass index is 25.85 kg/m  as calculated from the following:    Height as of this encounter: 1.727 m (5' 8\").    Weight as of this encounter: 77.1 kg (170 lb).           Daphne Montilla NP  [facility] is a 56 year old who presents for the following health issues : urgency, slight burning and pain with urination.    HPI       Genitourinary - Female  Onset/Duration: 3 days  Description:   Painful urination (Dysuria): YES           Frequency: YES  Blood in urine (Hematuria): no  Delay in urine (Hesitency): no  Intensity: moderate  Progression of Symptoms:  worsening  Accompanying Signs & Symptoms:  Fever/chills: no  Flank pain: no  Nausea and vomiting: no  Vaginal symptoms: none  Abdominal/Pelvic Pain: no  History:   History of frequent UTI s: YES- [facility]  History of kidney stones: YES  Sexually Active: YES  Possibility of pregnancy: No  Precipitating or alleviating factors: None  Therapies tried and outcome: Increase fluid intake      Review of Systems   Constitutional, HEENT, cardiovascular, pulmonary, gi and gu systems are negative, except as otherwise noted.      Objective    /66   Pulse 95   Temp 97.5  F (36.4  C) (Oral)   Resp 14   Ht 1.727 m (5' 8\")   Wt 77.1 kg (170 lb)   LMP 05/17/2021 (Exact Date)   SpO2 99%   Breastfeeding No   BMI 25.85 kg/m    Body mass index is 25.85 kg/m .  Physical Exam   GENERAL: healthy, alert and no distress  NECK: no adenopathy, no asymmetry, masses, or scars and thyroid normal to palpation  RESP: lungs clear to auscultation - no rales, rhonchi or wheezes  CV: regular rate and rhythm, normal S1 S2, no " S3 or S4, no murmur, click or rub, no peripheral edema and peripheral pulses strong  ABDOMEN: soft, nontender, no hepatosplenomegaly, no masses and bowel sounds normal  BACK: no CVA tenderness, no paralumbar tenderness    Results for orders placed or performed in visit on 07/29/21 (from the past 24 hour(s))   UA Macro with Reflex to Micro and Culture - lab collect    Specimen: Urine, Midstream   Result Value Ref Range    Color Urine Yellow Colorless, Straw, Light Yellow, Yellow    Appearance Urine Clear Clear    Glucose Urine Negative Negative mg/dL    Bilirubin Urine Negative Negative    Ketones Urine Negative Negative mg/dL    Specific Gravity Urine 1.025 1.003 - 1.035    Blood Urine Moderate (A) Negative    pH Urine 5.5 5.0 - 7.0    Protein Albumin Urine Negative Negative mg/dL    Urobilinogen Urine 0.2 0.2, 1.0 E.U./dL    Nitrite Urine Positive (A) Negative    Leukocyte Esterase Urine Large (A) Negative   Urine Microscopic Exam   Result Value Ref Range    Bacteria Urine Many (A) None Seen /HPF    RBC Urine 5-10 (A) 0-2 /HPF /HPF    WBC Urine >100 (A) 0-5 /HPF /HPF    Squamous Epithelials Urine Moderate (A) None Seen /LPF    WBC Clumps Urine Present (A) None Seen /HPF    Calcium Oxalate Crystals Urine Few (A) None Seen /HPF

## 2021-07-30 LAB — BACTERIA UR CULT: ABNORMAL

## 2021-10-11 ENCOUNTER — HOSPITAL ENCOUNTER (OUTPATIENT)
Dept: ULTRASOUND IMAGING | Facility: CLINIC | Age: 56
Discharge: HOME | End: 2021-10-11
Attending: INTERNAL MEDICINE | Admitting: INTERNAL MEDICINE
Payer: COMMERCIAL

## 2021-10-11 DIAGNOSIS — Z85.850 ENCOUNTER FOR FOLLOW-UP SURVEILLANCE OF THYROID CANCER: ICD-10-CM

## 2021-10-11 DIAGNOSIS — Z08 ENCOUNTER FOR FOLLOW-UP SURVEILLANCE OF THYROID CANCER: ICD-10-CM

## 2021-10-11 PROCEDURE — 76536 US EXAM OF HEAD AND NECK: CPT

## 2021-10-19 ENCOUNTER — MYC MEDICAL ADVICE (OUTPATIENT)
Dept: INTERNAL MEDICINE | Facility: CLINIC | Age: 56
End: 2021-10-19

## 2021-10-24 ENCOUNTER — HEALTH MAINTENANCE LETTER (OUTPATIENT)
Age: 56
End: 2021-10-24

## 2021-11-10 ENCOUNTER — TELEPHONE (OUTPATIENT)
Dept: INTERNAL MEDICINE | Facility: CLINIC | Age: 56
End: 2021-11-10
Payer: COMMERCIAL

## 2021-11-10 NOTE — LETTER
July 22, 2022      Jennie Davison  [address] [address]        Dear Jennie,    At [facility] we care about your health and well-being.  A review of your chart has indicated that you are due for a Mammogram, colonoscopy, pap and immunizations.    If you have any questions please contact us at [phone].            Sincerely    Your [facility] Care team.

## 2021-11-10 NOTE — LETTER
June 20, 2022      Jennie Davison  [address] [address]        Dear Jennie,      June 20, 2022      Jennie Davison  [address] [address] [address]      Your healthcare team cares about your health. To provide you with the best care,   we have reviewed your chart and based on our findings, we see that you are due to:     Pap and Mammogram. Please call the clinic at [phone] to schedule an appointment.    If you have already completed these items, please contact the clinic via phone or   [employer] so your care team can review and update your records. Thank you for   choosing [facility] for your healthcare needs. For any questions,   concerns, or to schedule an appointment please contact the clinic.       Healthy Regards,      Your [facility] Care Team

## 2021-11-10 NOTE — LETTER
November 10, 2021      Jennie Davison  [address] [address] [address]        Dear Jennie,    At [facility] we care about your health and well-being.  A review of your chart has indicated that you are due for a Pap and mammogram. Please contact us at [phone] to schedule and appointment.            Sincerely    Your [facility] health care team.

## 2021-11-10 NOTE — TELEPHONE ENCOUNTER
Patient Quality Outreach    Patient is due for the following:   Breast Cancer Screening - Mammogram  Cervical Cancer Screening - PAP Needed    NEXT STEPS:   Patient needs office visit for pap and mammogram.    Type of outreach:    Sent letter.    Next Steps:  Reach out within 90 days via Phone.    Max number of attempts reached: No. Will try again in 90 days if patient still on fail list.    Questions for provider review:    None     Bernice Peres MA  Chart routed to no one.              Patient Quality Outreach      Summary:    Patient has the following on her problem list/HM: None    Patient is due/failing the following:   Breast Cancer Screening - Mammogram  Cervical Cancer Screening - PAP Needed    Type of outreach:    Sent letter.    Questions for provider review:    None                                                                                                                                     KELLE PERES CMA       Chart routed to no one.

## 2021-11-10 NOTE — LETTER
May 4, 2022      Jennie Davison  [address] [address] [address]        Dear Jennie,      At [facility] we care about your health and well-being.  A review of your chart has indicated that you are due for a Mammogram and Pap smear.  Please contact us at [phone] to schedule an appointment.    If you have already had one or all of the above screening tests at another facility, please call us to update your chart.    Sincerely,      Your [facility] care team.

## 2022-02-13 ENCOUNTER — HEALTH MAINTENANCE LETTER (OUTPATIENT)
Age: 57
End: 2022-02-13

## 2022-07-22 NOTE — TELEPHONE ENCOUNTER
Patient Quality Outreach    Patient is due for the following:   Colon Cancer Screening -  Colonoscopy  Breast Cancer Screening - Mammogram  Cervical Cancer Screening - PAP Needed  Immunizations  -  Tdap    NEXT STEPS:   Schedule a yearly physical    Type of outreach:    Sent 8handst message. and Sent letter.    Next Steps:  Reach out within 90 days via 3rd attempt.    Max number of attempts reached: Yes. Will try again in 90 days if patient still on fail list.    Questions for provider review:    None     Bernice Almanzar MA  Chart routed to none.

## 2022-10-16 ENCOUNTER — HEALTH MAINTENANCE LETTER (OUTPATIENT)
Age: 57
End: 2022-10-16

## 2023-03-26 ENCOUNTER — HEALTH MAINTENANCE LETTER (OUTPATIENT)
Age: 58
End: 2023-03-26

## 2023-11-04 ENCOUNTER — HEALTH MAINTENANCE LETTER (OUTPATIENT)
Age: 58
End: 2023-11-04

## 2024-06-01 ENCOUNTER — HEALTH MAINTENANCE LETTER (OUTPATIENT)
Age: 59
End: 2024-06-01

## (undated) DEVICE — GUIDEWIRE URO STR STIFF .035"X150CM NITINOL 150NSS35

## (undated) DEVICE — GLOVE PROTEXIS POWDER FREE SMT 7.5  2D72PT75X

## (undated) DEVICE — GLOVE PROTEXIS BLUE W/NEU-THERA 6.0  2D73EB60

## (undated) DEVICE — BASKET RETRIEVAL 1.9FRX120CM ESCAPE NTNL 4 WIRE 390-201

## (undated) DEVICE — PACK CYSTO CUSTOM RIDGES

## (undated) DEVICE — PREP TECHNI-CARE CHLOROXYLENOL 3% 4OZ BOTTLE C222-4ZWO

## (undated) DEVICE — BAG CLEAR TRASH 1.3M 39X33" P4040C

## (undated) DEVICE — RAD RX ISOVUE 300 (50ML) 61% IOPAMIDOL CHARGE PER ML

## (undated) DEVICE — PREP POVIDONE IODINE SCRUB 7.5% 120ML

## (undated) DEVICE — SOL WATER IRRIG 3000ML BAG 2B7117

## (undated) DEVICE — PREP POVIDONE IODINE SOLUTION 10% 120ML

## (undated) DEVICE — LINEN HALF SHEET 5512

## (undated) DEVICE — CATH URETERAL FLEX TIP TIGERTAIL 06FRX70CM 139006

## (undated) DEVICE — COVER FOOTSWITCH W/CINCH 20X24" 923267

## (undated) DEVICE — LASER FIBER HOLMIUM 365UM HTB-365

## (undated) DEVICE — LINEN FULL SHEET 5511

## (undated) DEVICE — SOL WATER IRRIG 1000ML BOTTLE 2F7114

## (undated) DEVICE — GLOVE ESTEEM POWDER FREE SMT 7.5  2D72PT75

## (undated) DEVICE — GLOVE PROTEXIS W/NEU-THERA 7.5  2D73TE75

## (undated) DEVICE — TUBING IRRIG TUR Y TYPE 96" LF 6543-01

## (undated) RX ORDER — FENTANYL CITRATE 50 UG/ML
INJECTION, SOLUTION INTRAMUSCULAR; INTRAVENOUS
Status: DISPENSED
Start: 2018-04-20

## (undated) RX ORDER — PROPOFOL 10 MG/ML
INJECTION, EMULSION INTRAVENOUS
Status: DISPENSED
Start: 2018-04-14

## (undated) RX ORDER — CEFAZOLIN SODIUM 2 G/100ML
INJECTION, SOLUTION INTRAVENOUS
Status: DISPENSED
Start: 2018-04-20

## (undated) RX ORDER — PROPOFOL 10 MG/ML
INJECTION, EMULSION INTRAVENOUS
Status: DISPENSED
Start: 2018-04-18

## (undated) RX ORDER — GLYCOPYRROLATE 0.2 MG/ML
INJECTION INTRAMUSCULAR; INTRAVENOUS
Status: DISPENSED
Start: 2018-04-14

## (undated) RX ORDER — ETOMIDATE 2 MG/ML
INJECTION INTRAVENOUS
Status: DISPENSED
Start: 2018-04-18

## (undated) RX ORDER — PHENYLEPHRINE HCL IN 0.9% NACL 1 MG/10 ML
SYRINGE (ML) INTRAVENOUS
Status: DISPENSED
Start: 2018-04-18

## (undated) RX ORDER — ONDANSETRON 2 MG/ML
INJECTION INTRAMUSCULAR; INTRAVENOUS
Status: DISPENSED
Start: 2018-04-14

## (undated) RX ORDER — PROPOFOL 10 MG/ML
INJECTION, EMULSION INTRAVENOUS
Status: DISPENSED
Start: 2018-04-20

## (undated) RX ORDER — FENTANYL CITRATE 50 UG/ML
INJECTION, SOLUTION INTRAMUSCULAR; INTRAVENOUS
Status: DISPENSED
Start: 2018-04-14

## (undated) RX ORDER — KETOROLAC TROMETHAMINE 15 MG/ML
INJECTION, SOLUTION INTRAMUSCULAR; INTRAVENOUS
Status: DISPENSED
Start: 2018-04-14

## (undated) RX ORDER — ONDANSETRON 2 MG/ML
INJECTION INTRAMUSCULAR; INTRAVENOUS
Status: DISPENSED
Start: 2018-04-20

## (undated) RX ORDER — DEXAMETHASONE SODIUM PHOSPHATE 4 MG/ML
INJECTION, SOLUTION INTRA-ARTICULAR; INTRALESIONAL; INTRAMUSCULAR; INTRAVENOUS; SOFT TISSUE
Status: DISPENSED
Start: 2018-04-14

## (undated) RX ORDER — DEXAMETHASONE SODIUM PHOSPHATE 4 MG/ML
INJECTION, SOLUTION INTRA-ARTICULAR; INTRALESIONAL; INTRAMUSCULAR; INTRAVENOUS; SOFT TISSUE
Status: DISPENSED
Start: 2018-04-20

## (undated) RX ORDER — LIDOCAINE HYDROCHLORIDE 10 MG/ML
INJECTION, SOLUTION EPIDURAL; INFILTRATION; INTRACAUDAL; PERINEURAL
Status: DISPENSED
Start: 2018-04-14

## (undated) RX ORDER — LIDOCAINE HYDROCHLORIDE 10 MG/ML
INJECTION, SOLUTION EPIDURAL; INFILTRATION; INTRACAUDAL; PERINEURAL
Status: DISPENSED
Start: 2018-04-20

## (undated) RX ORDER — GLYCOPYRROLATE 0.2 MG/ML
INJECTION INTRAMUSCULAR; INTRAVENOUS
Status: DISPENSED
Start: 2018-04-20

## (undated) RX ORDER — EPHEDRINE SULFATE 50 MG/ML
INJECTION, SOLUTION INTRAMUSCULAR; INTRAVENOUS; SUBCUTANEOUS
Status: DISPENSED
Start: 2018-04-18